# Patient Record
Sex: MALE | Race: WHITE | NOT HISPANIC OR LATINO | Employment: OTHER | URBAN - METROPOLITAN AREA
[De-identification: names, ages, dates, MRNs, and addresses within clinical notes are randomized per-mention and may not be internally consistent; named-entity substitution may affect disease eponyms.]

---

## 2017-08-08 ENCOUNTER — TELEPHONE (OUTPATIENT)
Dept: URGENT CARE | Facility: CLINIC | Age: 52
End: 2017-08-08

## 2017-08-08 NOTE — TELEPHONE ENCOUNTER
Notified pt of [facility] self pay policy $180 for initial consultation. And physician referral needed for consulatation. Patient stated he will contact us ([facility]) in a few weeks if he decides to see general surgery.

## 2017-08-08 NOTE — TELEPHONE ENCOUNTER
"Notified patients wife that a referral could not be made due to lack of health insurance. Advice patients wife to call [facility] and set up payment arrangements for consultation . Patient wife verbally understood.\"stated she will notify her  and make 0ther arrangements for consultation.  "

## 2017-08-14 ENCOUNTER — TELEPHONE (OUTPATIENT)
Dept: URGENT CARE | Facility: CLINIC | Age: 52
End: 2017-08-14

## 2017-08-16 ENCOUNTER — TELEPHONE (OUTPATIENT)
Dept: URGENT CARE | Facility: CLINIC | Age: 52
End: 2017-08-16

## 2017-08-16 DIAGNOSIS — L72.0 INCLUSION CYST: Primary | ICD-10-CM

## 2017-08-16 RX ORDER — SULFAMETHOXAZOLE AND TRIMETHOPRIM 800; 160 MG/1; MG/1
1 TABLET ORAL 2 TIMES DAILY
Qty: 14 TABLET | Refills: 0 | Status: SHIPPED | OUTPATIENT
Start: 2017-08-16 | End: 2019-03-13

## 2019-02-25 ENCOUNTER — OFFICE VISIT (OUTPATIENT)
Dept: INTERNAL MEDICINE | Facility: CLINIC | Age: 54
End: 2019-02-25

## 2019-02-25 ENCOUNTER — TRANSCRIBE ORDERS (OUTPATIENT)
Dept: INTERNAL MEDICINE | Facility: CLINIC | Age: 54
End: 2019-02-25

## 2019-02-25 VITALS
HEART RATE: 72 BPM | BODY MASS INDEX: 32.5 KG/M2 | DIASTOLIC BLOOD PRESSURE: 76 MMHG | TEMPERATURE: 97.5 F | WEIGHT: 227 LBS | HEIGHT: 70 IN | SYSTOLIC BLOOD PRESSURE: 118 MMHG

## 2019-02-25 DIAGNOSIS — R51.9 CHRONIC INTRACTABLE HEADACHE, UNSPECIFIED HEADACHE TYPE: Primary | ICD-10-CM

## 2019-02-25 DIAGNOSIS — G89.29 CHRONIC INTRACTABLE HEADACHE, UNSPECIFIED HEADACHE TYPE: Primary | ICD-10-CM

## 2019-02-25 PROBLEM — F10.11 HISTORY OF ALCOHOL ABUSE: Status: ACTIVE | Noted: 2019-02-25

## 2019-02-25 PROBLEM — F51.01 PRIMARY INSOMNIA: Status: ACTIVE | Noted: 2019-02-25

## 2019-02-25 PROBLEM — N40.0 BENIGN PROSTATIC HYPERPLASIA WITHOUT LOWER URINARY TRACT SYMPTOMS: Status: ACTIVE | Noted: 2019-02-25

## 2019-02-25 PROCEDURE — 99202 OFFICE O/P NEW SF 15 MIN: CPT | Performed by: PHYSICIAN ASSISTANT

## 2019-02-25 RX ORDER — CEFDINIR 300 MG/1
300 CAPSULE ORAL 2 TIMES DAILY
Qty: 20 CAPSULE | Refills: 0 | Status: SHIPPED | OUTPATIENT
Start: 2019-02-25 | End: 2019-03-13

## 2019-02-25 RX ORDER — DOXAZOSIN 2 MG/1
1 TABLET ORAL DAILY
Refills: 3 | COMMUNITY
Start: 2019-02-15 | End: 2020-06-19 | Stop reason: SDUPTHER

## 2019-02-25 NOTE — PATIENT INSTRUCTIONS
CT of the head with and without contrast tomorrow.  If CT does not show a sinus infection he will stop the Ceftin ear and we will try him on migraine medication.  He will follow-up in a couple weeks as scheduled for fasting labs.

## 2019-02-25 NOTE — PROGRESS NOTES
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 No pertinent surgical history.    Family History   Problem Relation Age of Onset   • Cancer Mother         lung and ovarian        Social History     Socioeconomic History   • Marital status:      Spouse name: Not on file   • Number of children: Not on file   • Years of education: Not on file   • Highest education level: Not on file   Social Needs   • Financial resource strain: Not on file   • Food insecurity - worry: Not on file   • Food insecurity - inability: Not on file   • Transportation needs - medical: Not on file   • Transportation needs - non-medical: Not on file   Occupational History   • Not on file   Tobacco Use   • Smoking status: Never Smoker   • Smokeless tobacco: Never Used   Substance and Sexual Activity   • Alcohol use: No     Frequency: Never     Comment: history of alcohol abuse, quit about 15 years ago    • Drug use: No   • Sexual activity: Not on file   Other Topics Concern   • Not on file   Social History Narrative   • Not on file       Allergies   Allergen Reactions   • Tetanus Toxoids Hives       Review of Systems:     Review of Systems   Constitutional: Positive for fatigue and unexpected weight gain. Negative for chills and fever.   HENT: Positive for sinus pressure. Negative for congestion and ear pain.    Eyes: Positive for pain and visual disturbance.   Respiratory: Negative for cough, chest tightness, shortness of breath and wheezing.    Cardiovascular: Negative for chest pain and palpitations.   Gastrointestinal: Negative for abdominal pain, blood in  "stool and constipation.   Skin: Negative for color change.   Allergic/Immunologic: Negative for environmental allergies.   Neurological: Positive for headache. Negative for dizziness and speech difficulty.   Psychiatric/Behavioral: Negative for decreased concentration. The patient is not nervous/anxious.        Vital Signs  Vitals:    02/25/19 1412   Weight: 103 kg (227 lb)   Height: 178 cm (70.08\")         Current Outpatient Medications:   •  doxazosin (CARDURA) 2 MG tablet, Take 1 tablet by mouth Daily., Disp: , Rfl: 3  •  zolpidem (AMBIEN) 10 MG tablet, Take 10 mg by mouth At Night As Needed for Sleep., Disp: , Rfl:   •  cephalexin (KEFLEX) 500 MG capsule, Take 1 capsule by mouth 2 (Two) Times a Day., Disp: 10 capsule, Rfl: 0  •  doxazosin (CARDURA) 1 MG tablet, Take 1 mg by mouth Every Night., Disp: , Rfl:   •  sulfamethoxazole-trimethoprim (BACTRIM DS,SEPTRA DS) 800-160 MG per tablet, Take 1 tablet by mouth 2 (Two) Times a Day., Disp: 14 tablet, Rfl: 0    Physical Exam:    Physical Exam     Results Review:    I reviewed the patient's new clinical results.    Assessment/Plan   Problem List Items Addressed This Visit     None          Plan of care reviewed with patient at the conclusion of today's visit. Education was provided regarding diagnosis, management, and any prescribed or recommended OTC medications.Patient verbalizes understanding of and agreement with management plan.     Meghan Urena LPN  "

## 2019-02-25 NOTE — PROGRESS NOTES
Patient Care Team:  Sonam Stiles PA-C as PCP - General (Internal Medicine)    Chief Complaint;:   Chief Complaint   Patient presents with   • Establish Care     does not have a previous PCP, seen at [facility]   • Sinus Problem     constant pressure x 2-3 months   • Headache        Subjective     HPI  54-year-old white male presents to the office today to establish care.  He has had a persistent almost daily headache for the last several months.  He does not have a regular PCP.  He is been going to the [facility] and being treated for sinus infections.  He denies any nasal congestion or drainage.  No cough.  He does note that bright lights and loud noises bother him.  The discomfort is around his eyes.  He does have new bifocals but is having difficulty adjusting to them.    He is a non-smoker but does chew tobacco.  He has a remote history of alcohol abuse.    He has an appointment in a couple weeks for a complete physical with fasting labs.  He does have a history of BPH followed by Dr. Octavio Mendieta.  He also has sleep disturbances and takes Ambien as needed.  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   • Stroke (CMS/[facility]) 1990       Social History     Socioeconomic History   • Marital status:      Spouse name: Not on file   • Number of children: Not on file   • Years of education: Not on file   • Highest education level: Not on file   Social Needs   • Financial resource strain: Not on file   • Food insecurity - worry: Not on file   • Food insecurity - inability: Not on file   • Transportation needs - medical: Not on file   • Transportation needs - non-medical: Not on file   Occupational History   • Not on file   Tobacco Use   • Smoking status: Never Smoker   • Smokeless tobacco: Never Used   Substance and Sexual Activity   • Alcohol use: No     Frequency: Never     Comment: history of alcohol abuse, quit about 15 years ago    • Drug use: No  "  • Sexual activity: Not on file   Other Topics Concern   • Not on file   Social History Narrative   • Not on file       Allergies   Allergen Reactions   • Tetanus Toxoids Hives       Review of Systems:     Review of Systems   Constitutional: Positive for fatigue.   HENT: Negative.    Eyes: Positive for photophobia.   Respiratory: Negative.    Cardiovascular: Negative.    Neurological: Positive for headache. Negative for dizziness, tremors, seizures, syncope, facial asymmetry, speech difficulty, weakness, light-headedness, numbness, memory problem and confusion.   Psychiatric/Behavioral: Negative.        Vital Signs  Vitals:    02/25/19 1412   BP: 118/76   BP Location: Right arm   Patient Position: Sitting   Cuff Size: Large Adult   Pulse: 72   Temp: 97.5 °F (36.4 °C)   TempSrc: Temporal   Weight: 103 kg (227 lb)   Height: 178 cm (70.08\")         Current Outpatient Medications:   •  doxazosin (CARDURA) 2 MG tablet, Take 1 tablet by mouth Daily., Disp: , Rfl: 3  •  zolpidem (AMBIEN) 10 MG tablet, Take 10 mg by mouth At Night As Needed for Sleep., Disp: , Rfl:   •  cefdinir (OMNICEF) 300 MG capsule, Take 1 capsule by mouth 2 (Two) Times a Day., Disp: 20 capsule, Rfl: 0  •  doxazosin (CARDURA) 1 MG tablet, Take 1 mg by mouth Every Night., Disp: , Rfl:   •  sulfamethoxazole-trimethoprim (BACTRIM DS,SEPTRA DS) 800-160 MG per tablet, Take 1 tablet by mouth 2 (Two) Times a Day., Disp: 14 tablet, Rfl: 0    Physical Exam:    Physical Exam   Constitutional: He is oriented to person, place, and time. He appears well-developed and well-nourished.   HENT:   Head: Normocephalic and atraumatic.   Right Ear: External ear normal.   Left Ear: External ear normal.   Mouth/Throat: Oropharynx is clear and moist.   Neck: Normal range of motion. Neck supple.   Cardiovascular: Normal rate, regular rhythm and normal heart sounds.   Pulmonary/Chest: Effort normal and breath sounds normal.   Lymphadenopathy:     He has no cervical adenopathy. "   Neurological: He is alert and oriented to person, place, and time. He displays normal reflexes. No cranial nerve deficit or sensory deficit. He exhibits normal muscle tone. Coordination normal.   Skin: Skin is warm and dry.   Psychiatric: He has a normal mood and affect. His behavior is normal. Thought content normal.   Nursing note and vitals reviewed.      Procedures      Assessment/Plan   Problem List Items Addressed This Visit     None      Visit Diagnoses     Chronic intractable headache, unspecified headache type    -  Primary    Relevant Orders    CT Head With & Without Contrast        Patient Instructions   CT of the head with and without contrast tomorrow.  If CT does not show a sinus infection he will stop the Ceftin ear and we will try him on migraine medication.  He will follow-up in a couple weeks as scheduled for fasting labs.      Plan of care reviewed with patient at the conclusion of today's visit. Education was provided regarding diagnosis, management, and any prescribed or recommended OTC medications.Patient verbalizes understanding of and agreement with management plan.     Sonam Stiles PA-C

## 2019-02-26 ENCOUNTER — HOSPITAL ENCOUNTER (OUTPATIENT)
Dept: CT IMAGING | Facility: HOSPITAL | Age: 54
Discharge: HOME | End: 2019-02-26
Admitting: PHYSICIAN ASSISTANT

## 2019-02-26 DIAGNOSIS — R51.9 CHRONIC INTRACTABLE HEADACHE, UNSPECIFIED HEADACHE TYPE: ICD-10-CM

## 2019-02-26 DIAGNOSIS — G89.29 CHRONIC INTRACTABLE HEADACHE, UNSPECIFIED HEADACHE TYPE: ICD-10-CM

## 2019-02-26 PROCEDURE — 70450 CT HEAD/BRAIN W/O DYE: CPT

## 2019-02-27 RX ORDER — SUMATRIPTAN 50 MG/1
TABLET, FILM COATED ORAL
Qty: 9 TABLET | Refills: 3 | Status: SHIPPED | OUTPATIENT
Start: 2019-02-27 | End: 2020-06-19

## 2019-03-13 ENCOUNTER — OFFICE VISIT (OUTPATIENT)
Dept: INTERNAL MEDICINE | Facility: CLINIC | Age: 54
End: 2019-03-13

## 2019-03-13 VITALS
HEIGHT: 70 IN | WEIGHT: 232 LBS | SYSTOLIC BLOOD PRESSURE: 126 MMHG | DIASTOLIC BLOOD PRESSURE: 82 MMHG | HEART RATE: 76 BPM | BODY MASS INDEX: 33.21 KG/M2

## 2019-03-13 DIAGNOSIS — E78.2 MIXED HYPERLIPIDEMIA: Primary | ICD-10-CM

## 2019-03-13 DIAGNOSIS — R51.9 CHRONIC NONINTRACTABLE HEADACHE, UNSPECIFIED HEADACHE TYPE: Primary | ICD-10-CM

## 2019-03-13 DIAGNOSIS — E66.09 CLASS 1 OBESITY DUE TO EXCESS CALORIES WITHOUT SERIOUS COMORBIDITY WITH BODY MASS INDEX (BMI) OF 33.0 TO 33.9 IN ADULT: ICD-10-CM

## 2019-03-13 DIAGNOSIS — F51.01 PRIMARY INSOMNIA: ICD-10-CM

## 2019-03-13 DIAGNOSIS — G47.00 INSOMNIA, UNSPECIFIED TYPE: Primary | ICD-10-CM

## 2019-03-13 DIAGNOSIS — J34.89 NASAL OBSTRUCTION: ICD-10-CM

## 2019-03-13 DIAGNOSIS — N40.0 BENIGN PROSTATIC HYPERPLASIA WITHOUT LOWER URINARY TRACT SYMPTOMS: ICD-10-CM

## 2019-03-13 DIAGNOSIS — Z00.00 ENCOUNTER FOR PREVENTIVE HEALTH EXAMINATION: ICD-10-CM

## 2019-03-13 DIAGNOSIS — G89.29 CHRONIC NONINTRACTABLE HEADACHE, UNSPECIFIED HEADACHE TYPE: Primary | ICD-10-CM

## 2019-03-13 PROBLEM — R73.9 HYPERGLYCEMIA: Status: ACTIVE | Noted: 2019-03-13

## 2019-03-13 PROBLEM — R79.89 ELEVATED LIVER FUNCTION TESTS: Status: ACTIVE | Noted: 2019-03-13

## 2019-03-13 PROBLEM — E66.811 CLASS 1 OBESITY DUE TO EXCESS CALORIES WITHOUT SERIOUS COMORBIDITY IN ADULT: Status: ACTIVE | Noted: 2019-03-13

## 2019-03-13 LAB
ALBUMIN SERPL-MCNC: 4.99 G/DL (ref 3.2–4.8)
ALBUMIN/GLOB SERPL: 2.5 G/DL (ref 1.5–2.5)
ALP SERPL-CCNC: 64 U/L (ref 25–100)
ALT SERPL W P-5'-P-CCNC: 43 U/L (ref 7–40)
ANION GAP SERPL CALCULATED.3IONS-SCNC: 9 MMOL/L (ref 3–11)
ARTICHOKE IGE QN: 136 MG/DL (ref 0–130)
AST SERPL-CCNC: 28 U/L (ref 0–33)
BASOPHILS # BLD AUTO: 0.02 10*3/MM3 (ref 0–0.2)
BASOPHILS NFR BLD AUTO: 0.3 % (ref 0–1)
BILIRUB SERPL-MCNC: 0.6 MG/DL (ref 0.3–1.2)
BUN BLD-MCNC: 18 MG/DL (ref 9–23)
BUN/CREAT SERPL: 13.7 (ref 7–25)
CALCIUM SPEC-SCNC: 9.6 MG/DL (ref 8.7–10.4)
CHLORIDE SERPL-SCNC: 105 MMOL/L (ref 99–109)
CHOLEST SERPL-MCNC: 216 MG/DL (ref 0–200)
CO2 SERPL-SCNC: 26 MMOL/L (ref 20–31)
CREAT BLD-MCNC: 1.31 MG/DL (ref 0.6–1.3)
CRP SERPL-MCNC: 0.13 MG/DL (ref 0–1)
DEPRECATED RDW RBC AUTO: 42.5 FL (ref 37–54)
EOSINOPHIL # BLD AUTO: 0.09 10*3/MM3 (ref 0–0.3)
EOSINOPHIL NFR BLD AUTO: 1.5 % (ref 0–3)
ERYTHROCYTE [DISTWIDTH] IN BLOOD BY AUTOMATED COUNT: 12.5 % (ref 11.3–14.5)
ERYTHROCYTE [SEDIMENTATION RATE] IN BLOOD: 8 MM/HR (ref 0–20)
GFR SERPL CREATININE-BSD FRML MDRD: 57 ML/MIN/1.73
GLOBULIN UR ELPH-MCNC: 2 GM/DL
GLUCOSE BLD-MCNC: 108 MG/DL (ref 70–100)
HCT VFR BLD AUTO: 43.2 % (ref 38.9–50.9)
HDLC SERPL-MCNC: 33 MG/DL (ref 40–60)
HGB BLD-MCNC: 14.3 G/DL (ref 13.1–17.5)
IMM GRANULOCYTES # BLD AUTO: 0.02 10*3/MM3 (ref 0–0.05)
IMM GRANULOCYTES NFR BLD AUTO: 0.3 % (ref 0–0.6)
LYMPHOCYTES # BLD AUTO: 1.79 10*3/MM3 (ref 0.6–4.8)
LYMPHOCYTES NFR BLD AUTO: 29.8 % (ref 24–44)
MCH RBC QN AUTO: 30.6 PG (ref 27–31)
MCHC RBC AUTO-ENTMCNC: 33.1 G/DL (ref 32–36)
MCV RBC AUTO: 92.5 FL (ref 80–99)
MONOCYTES # BLD AUTO: 0.43 10*3/MM3 (ref 0–1)
MONOCYTES NFR BLD AUTO: 7.2 % (ref 0–12)
NEUTROPHILS # BLD AUTO: 3.68 10*3/MM3 (ref 1.5–8.3)
NEUTROPHILS NFR BLD AUTO: 61.2 % (ref 41–71)
PLATELET # BLD AUTO: 269 10*3/MM3 (ref 150–450)
PMV BLD AUTO: 10.2 FL (ref 6–12)
POTASSIUM BLD-SCNC: 4.8 MMOL/L (ref 3.5–5.5)
PROT SERPL-MCNC: 7 G/DL (ref 5.7–8.2)
PSA SERPL-MCNC: 0.48 NG/ML (ref 0–4)
RBC # BLD AUTO: 4.67 10*6/MM3 (ref 4.2–5.76)
SODIUM BLD-SCNC: 140 MMOL/L (ref 132–146)
TRIGL SERPL-MCNC: 230 MG/DL (ref 0–150)
WBC NRBC COR # BLD: 6.01 10*3/MM3 (ref 3.5–10.8)

## 2019-03-13 PROCEDURE — 36415 COLL VENOUS BLD VENIPUNCTURE: CPT | Performed by: PHYSICIAN ASSISTANT

## 2019-03-13 PROCEDURE — 85025 COMPLETE CBC W/AUTO DIFF WBC: CPT | Performed by: PHYSICIAN ASSISTANT

## 2019-03-13 PROCEDURE — G0103 PSA SCREENING: HCPCS | Performed by: PHYSICIAN ASSISTANT

## 2019-03-13 PROCEDURE — 80061 LIPID PANEL: CPT | Performed by: PHYSICIAN ASSISTANT

## 2019-03-13 PROCEDURE — 99396 PREV VISIT EST AGE 40-64: CPT | Performed by: PHYSICIAN ASSISTANT

## 2019-03-13 PROCEDURE — 80053 COMPREHEN METABOLIC PANEL: CPT | Performed by: PHYSICIAN ASSISTANT

## 2019-03-13 PROCEDURE — 86140 C-REACTIVE PROTEIN: CPT | Performed by: PHYSICIAN ASSISTANT

## 2019-03-13 RX ORDER — ATORVASTATIN CALCIUM 20 MG/1
20 TABLET, FILM COATED ORAL DAILY
Qty: 30 TABLET | Refills: 2 | Status: SHIPPED | OUTPATIENT
Start: 2019-03-13 | End: 2019-07-18 | Stop reason: SDUPTHER

## 2019-03-13 RX ORDER — ZOLPIDEM TARTRATE 10 MG/1
10 TABLET ORAL NIGHTLY PRN
Qty: 30 TABLET | Refills: 2 | Status: SHIPPED | OUTPATIENT
Start: 2019-03-13 | End: 2019-07-25 | Stop reason: SDUPTHER

## 2019-03-13 NOTE — PATIENT INSTRUCTIONS
Check fasting labs today.  Will refer for screening colonoscopy.    We will also refer to ENT for nasal obstruction and prior fractured nose.  Will also refer him to neurology for further evaluations of his headache.    Encouraged 5 pound weight loss by next visit.    He will follow-up here in 3 months.

## 2019-03-13 NOTE — PROGRESS NOTES
Patient Care Team:  Sonam Stiles PA-C as PCP - General (Internal Medicine)    Chief Complaint;:   Chief Complaint   Patient presents with   • Annual Exam     He is fasting        Subjective     HPI  54-year-old white male presents to the office today for his fasting physical exam.  It has been several years since he has seen health provider.  I saw him a few weeks ago for chronic headache.  The headaches been going on for the last 2-3 months.  Initially it was fluctuating from right to left side of the face but now it is primarily just on the left side.  He had a CT scan of the head that looked fine.  He tried Imitrex without any relief at all.  He states the pain is almost like a superficial burning pain.  He denies any visual changes, tinnitus, or hearing loss.    He is also had some nasal fullness and as he describes feels like an obstruction up in his nose.  He did fracture his nose as a child.    He also had put on his new patient form a few weeks ago that he had had a stroke in the 1990s while in the .  He states that after a night of drinking he got up to go the bathroom and passed out.  He woke up in the hospital and was told that he had had a stroke.  He had no extremity weakness speech difficulties or other residual symptoms.  He states he was discharged the next day without any additional testing.    Insomnia he has been on Ambien for the last 18 months.  He takes it occasionally as needed.    He also has BPH which is controlled with doxazosin, he only has to get up to urinate about once a night.  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 No pertinent surgical history.    Family History   Problem Relation Age of Onset   • Cancer Mother         lung and ovarian        Social History     Socioeconomic History   • Marital status:      Spouse name: Not on file   • Number of children: Not  "on file   • Years of education: Not on file   • Highest education level: Not on file   Social Needs   • Financial resource strain: Not on file   • Food insecurity - worry: Not on file   • Food insecurity - inability: Not on file   • Transportation needs - medical: Not on file   • Transportation needs - non-medical: Not on file   Occupational History   • Not on file   Tobacco Use   • Smoking status: Never Smoker   • Smokeless tobacco: Never Used   Substance and Sexual Activity   • Alcohol use: No     Frequency: Never     Comment: history of alcohol abuse, quit about 15 years ago    • Drug use: No   • Sexual activity: Not on file   Other Topics Concern   • Not on file   Social History Narrative   • Not on file       Allergies   Allergen Reactions   • Tetanus Toxoids Hives       Review of Systems:     Review of Systems   Constitutional: Negative.    HENT: Negative.    Eyes: Negative.    Respiratory: Negative.    Cardiovascular: Negative.    Gastrointestinal: Negative.    Endocrine: Negative.    Genitourinary: Negative for urinary incontinence, decreased urine volume, difficulty urinating, discharge, dysuria, flank pain, frequency, hematuria, nocturia, penile pain, erectile dysfunction, penile swelling, scrotal swelling, testicular pain and urgency.   Musculoskeletal: Positive for arthralgias. Negative for back pain, gait problem, joint swelling, myalgias, neck pain and bursitis.   Skin: Negative.    Neurological: Positive for headache. Negative for tremors, seizures, syncope, speech difficulty, weakness and numbness.   Hematological: Negative.    Psychiatric/Behavioral: Negative.        Vital Signs  Vitals:    03/13/19 0827   BP: 126/82   BP Location: Left arm   Patient Position: Sitting   Cuff Size: Adult   Pulse: 76   Weight: 105 kg (232 lb)   Height: 178 cm (70.08\")         Current Outpatient Medications:   •  doxazosin (CARDURA) 2 MG tablet, Take 1 tablet by mouth Daily., Disp: , Rfl: 3  •  SUMAtriptan (IMITREX) 50 " MG tablet, Take one tablet at onset of headache. May repeat dose one time in 2 hours if headache not relieved., Disp: 9 tablet, Rfl: 3  •  zolpidem (AMBIEN) 10 MG tablet, Take 10 mg by mouth At Night As Needed for Sleep., Disp: , Rfl:     Health Maintenance:  not up to date. Referral placed.     Physical Exam:    Physical Exam   Constitutional: He is oriented to person, place, and time. He appears well-developed and well-nourished.   HENT:   Head: Normocephalic and atraumatic.   Right Ear: External ear normal.   Left Ear: External ear normal.   Nose: Nose normal.   Mouth/Throat: Oropharynx is clear and moist.   Neck: Normal range of motion. Neck supple. No thyromegaly present.   Cardiovascular: Normal rate, regular rhythm and normal heart sounds.   Pulmonary/Chest: Effort normal and breath sounds normal.   Abdominal: Soft. Bowel sounds are normal. He exhibits no distension and no mass. There is no tenderness. There is no rebound and no guarding. No hernia.   Musculoskeletal: Normal range of motion. He exhibits no edema, tenderness or deformity.   Lymphadenopathy:     He has no cervical adenopathy.   Neurological: He is oriented to person, place, and time. He displays normal reflexes. No cranial nerve deficit or sensory deficit. He exhibits normal muscle tone. Coordination normal.   Skin: Skin is warm and dry.   Psychiatric: He has a normal mood and affect. His behavior is normal. Judgment and thought content normal.   Nursing note and vitals reviewed.       Results Review:    I reviewed the patient's new clinical results.    Assessment/Plan   Problem List Items Addressed This Visit        Digestive    Class 1 obesity due to excess calories without serious comorbidity in adult       Genitourinary    Benign prostatic hyperplasia without lower urinary tract symptoms    Relevant Orders    PSA SCREENING       Other    Primary insomnia      Other Visit Diagnoses     Chronic nonintractable headache, unspecified headache  type    -  Primary    Refer to neurology.    Relevant Orders    Comprehensive metabolic panel    Lipid panel    CBC w AUTO Differential    PSA SCREENING    Ambulatory Referral to Neurology    C-reactive protein    Sedimentation rate, automated    Encounter for preventive health examination        Check fasting labs.  Schedule for screening colonoscopy.    Relevant Orders    Comprehensive metabolic panel    Lipid panel    CBC w AUTO Differential    PSA SCREENING    Ambulatory Referral For Screening Colonoscopy    Nasal obstruction        Refer to ENT.    Relevant Orders    Ambulatory Referral to ENT (Otolaryngology)        Patient Instructions   Check fasting labs today.  Will refer for screening colonoscopy.    We will also refer to ENT for nasal obstruction and prior fractured nose.  Will also refer him to neurology for further evaluations of his headache.    Encouraged 5 pound weight loss by next visit.    He will follow-up here in 3 months.    Counseled the patient on the following topics, disease prevention  Plan of care reviewed with patient at the conclusion of today's visit. Education was provided regarding diagnosis, management, and any prescribed or recommended OTC medications.Patient verbalizes understanding of and agreement with management plan.     Sonam Stiles PA-C

## 2019-07-18 RX ORDER — ATORVASTATIN CALCIUM 20 MG/1
20 TABLET, FILM COATED ORAL DAILY
Qty: 30 TABLET | Refills: 2 | Status: SHIPPED | OUTPATIENT
Start: 2019-07-18 | End: 2019-11-25 | Stop reason: SDUPTHER

## 2019-07-25 DIAGNOSIS — G47.00 INSOMNIA, UNSPECIFIED TYPE: ICD-10-CM

## 2019-07-25 RX ORDER — ZOLPIDEM TARTRATE 10 MG/1
10 TABLET ORAL NIGHTLY PRN
Qty: 30 TABLET | Refills: 2 | Status: SHIPPED | OUTPATIENT
Start: 2019-07-25 | End: 2019-11-18 | Stop reason: SDUPTHER

## 2019-07-25 NOTE — TELEPHONE ENCOUNTER
Last seen:3/13/19    Last filled:3/13/19    Next appt:NON SCHEDULED    CHARISSE:PENDING    (JACQUELINE'S PATIENT)

## 2019-08-01 ENCOUNTER — TELEPHONE (OUTPATIENT)
Dept: INTERNAL MEDICINE | Facility: CLINIC | Age: 54
End: 2019-08-01

## 2019-08-01 RX ORDER — CLARITHROMYCIN 500 MG/1
500 TABLET, COATED ORAL 2 TIMES DAILY
Qty: 20 TABLET | Refills: 0 | Status: SHIPPED | OUTPATIENT
Start: 2019-08-01 | End: 2020-06-19

## 2019-08-01 RX ORDER — CLARITHROMYCIN 250 MG/1
500 TABLET, FILM COATED ORAL 2 TIMES DAILY
Qty: 20 TABLET | Refills: 0 | Status: SHIPPED | OUTPATIENT
Start: 2019-08-01 | End: 2019-08-01

## 2019-08-01 NOTE — TELEPHONE ENCOUNTER
ZEFERINO FROM PHARMACY CALLED NEEDING VERIFICATION ON PT'S CLARITHROMYCIN  IT WAS FOR 5 DAYS AND PT TOLD HIM IT WAS SUPPOSED TO BE FOR 10 DAYS   I INFORMED ZEFERINO PER JACQUELINE THAT IT IS FOR 10 DAYS

## 2019-09-13 ENCOUNTER — TELEPHONE (OUTPATIENT)
Dept: INTERNAL MEDICINE | Facility: CLINIC | Age: 54
End: 2019-09-13

## 2019-09-13 NOTE — TELEPHONE ENCOUNTER
PT'S WIFE CALLED STATING PT HAS A SPIDER BITE IT'S RED AND SWOLLEN  I ADVISED HER HE NEEDS TO GO TO ER SHE WANTED TO KNOW IF HE COULD GO TO URGENT CARE AND I ADVISED HER PER ZINA YES

## 2019-11-18 DIAGNOSIS — G47.00 INSOMNIA, UNSPECIFIED TYPE: ICD-10-CM

## 2019-11-18 RX ORDER — ZOLPIDEM TARTRATE 10 MG/1
10 TABLET ORAL NIGHTLY PRN
Qty: 30 TABLET | Refills: 2 | Status: SHIPPED | OUTPATIENT
Start: 2019-11-18 | End: 2020-06-19 | Stop reason: SDUPTHER

## 2019-11-25 RX ORDER — ATORVASTATIN CALCIUM 20 MG/1
TABLET, FILM COATED ORAL
Qty: 30 TABLET | Refills: 5 | Status: SHIPPED | OUTPATIENT
Start: 2019-11-25 | End: 2020-06-19 | Stop reason: SDUPTHER

## 2020-01-27 NOTE — TELEPHONE ENCOUNTER
Continuity of Care Document (CCD)

 Created on:2020



Patient:Wilmer Campbell

Sex:Male

:1931

External Reference #:[id number]





Demographics







 Address  [address] [place] [address]

 

 Home Phone  [phone]

 

 Mobile Phone  [phone]

 

 Preferred Language  en

 

 Marital Status  Not  or 

 

 Sikh Affiliation  Unknown

 

 Race  White

 

 Ethnic Group  Not  or 









Author







 Name  Florencio Baker M.D.

 

 Address  [address]



   Unavailable



   [place] [address]









Care Team Providers







 Name  Role  Phone

 

 Tab Sadler MD - General Practice  Care Team Information   [phone]
[phone]









Problems







 Description

 

 No Information Available







Social History







 Type  Date  Description  Comments

 

 Birth Sex    Unknown  

 

 ETOH Use    Occasionally consumed wine in the past  

 

 Tobacco Use  Start: Unknown  Patient has never smoked  

 

 Smoking Status  Reviewed: 20  Patient has never smoked  







Allergies, Adverse Reactions, Alerts







 Active Allergies  Reaction  Severity  Comments  Date

 

 Contrast Dye      CT dye  2018

 

 Statins        2018







Medications







 Active Medications  SIG  Qnty  Indications  Ordering  Date



         Provider  

 

 Warfarin Sodium  Take 1 Tablet By      Unknown  



              5mg  Mouth Every Day        



 Tablets          



           

 

 Cephalexin  Take 1 Capsule By      Unknown  



         250mg Capsules  Mouth Three Times A        



   Day        

 

 Furosemide  Take 2 Tablets By      Unknown  



         20mg Tablets  Mouth Every Day For        



   5 Days Then        



   Decrease To Once        



   Daily        

 

 Prednisone  Take 1 Tablet By      Unknown  



         50mg Tablets  Mouth Every 6 Hours        



   as Directed        



   Starting AT 10:30        



   PM On         

 

 Amoxicillin  Take 4 Tablets By      Unknown  



          500mg Tablets  Mouth 1 Hour Before        



   Dental Procedure        

 

 Metoprolol Succinate        Unknown  



 ER          



 25mg Tablets ER 24HR          



           

 

 Praluent        Unknown  



       75mg/ml Solution          



 Auto-Inject          



           

 

 Lisinopril        Unknown  



         2.5mg Tablets          



           

 

 Neurontin        Unknown  



        300mg Capsules          



           

 

 Omeprazole        Unknown  



         20mg Capsules          



 DR          

 

 Meclizine HCL        Unknown  



            25mg          



 Tablets          



           

 

 Guaifenesin  take 1 tablet by      Unknown  



          400mg Tablets  mouth every 4 hours        



   as needed for chest        



   congestion        

 

 Ferrous Sulfate  1 by mouth every      Unknown  



              325(65Fe)  day        



 mg Tablets          



           

 

 Temazepam        Unknown  



        7.5mg Capsules          



           

 

 Levothyroxine Sodium        Unknown  



           



 112mcg Tablets          



           

 

 Aspir-Low  1 by mouth every      Unknown  



        81mg Tablets DR  day        



           







Immunizations







 Description

 

 No Information Available







Vital Signs







 Date  Vital  Result  Comment

 

 2020  2:22pm  Intraocular Pressure Right Eye  13 mmHg  









 Intraocular Pressure Left Eye  13 mmHg  









 2019 10:23am  Intraocular Pressure Right Eye  13 mmHg  









 Intraocular Pressure Left Eye  12 mmHg  







Results







 Description

 

 No Information Available







Procedures







 Date  Code  Description  Status

 

 2020  62473  Oct Retina  Completed

 

 2020  79671  Eye Exam Est Intermediate  Completed

 

 2019  09802  Ophthalmoscopy Subsequent  Completed

 

 2019  60511  Sensorimotor Examination W/Mult Measurements Ocular  
Completed



     Deviation  

 

 2019  46784  Refraction  Completed

 

 2019  44943  Eye Exam Est Intermediate  Completed

 

 2019  10346  Fundus Photography W/Interpretation & Report  Completed

 

 2019  81803  Ophthalmoscopy Subsequent  Completed

 

 2019  64602  Eye Exam Est Intermediate  Completed







Medical Devices







 Description

 

 No Information Available







Encounters







 Description

 

 No Information Available







Assessments







 Date  Code  Description  Provider

 

 2020  H35.373  Puckering of macula, bilateral  Florencio Baker M.D.

 

 2020  Z96.1  Presence of intraocular lens  Florencio Baker M.D.

 

 2020  H43.813  Vitreous degeneration, bilateral  Florencio Baker M.D.

 

 2020  H50.22  Vertical strabismus, left eye  Florencio Baker M.D.

 

 2019  Z96.1  Presence of intraocular lens  Florencio Baker M.D.

 

 2019  H35.373  Puckering of macula, bilateral  Florencio Baker M.D.

 

 2019  H43.813  Vitreous degeneration, bilateral  Florencio Baker M.D.

 

 2019  H50.22  Vertical strabismus, left eye  Florencio Baker M.D.

 

 2019  H43.813  Vitreous degeneration, bilateral  Florencio Baker M.D.

 

 2019  H35.373  Puckering of macula, bilateral  Florencio Baker M.D.







Plan of Treatment

2020 - Florencio Baker M.D.H35.373 Puckering of macula, lelzkmdptM66.1 
Presence of intraocular lensH43.813 Vitreous degeneration, sollaillsE32.22 
Vertical strabismus, left eyeFollow up:1 yr



Functional Status







 Description

 

 No Information Available







Mental Status







 Description

 

 No Information Available







Referrals







 Description

 

 No Information Available Pt called and had been at the [facility] office to see Dr Morrissey today and the Dr was called out for surgery. His next appt is Aug 29th with Dr Morrissey. He called requesting an Rx for Bactrim. Dr Parrish consulted and he agreed to send in more Bactrim to the pt's pharmacy.

## 2020-06-19 ENCOUNTER — OFFICE VISIT (OUTPATIENT)
Dept: INTERNAL MEDICINE | Facility: CLINIC | Age: 55
End: 2020-06-19

## 2020-06-19 VITALS
TEMPERATURE: 97.1 F | OXYGEN SATURATION: 100 % | BODY MASS INDEX: 32.78 KG/M2 | DIASTOLIC BLOOD PRESSURE: 70 MMHG | HEIGHT: 70 IN | RESPIRATION RATE: 16 BRPM | HEART RATE: 65 BPM | WEIGHT: 229 LBS | SYSTOLIC BLOOD PRESSURE: 122 MMHG

## 2020-06-19 DIAGNOSIS — G47.00 INSOMNIA, UNSPECIFIED TYPE: ICD-10-CM

## 2020-06-19 DIAGNOSIS — F51.01 PRIMARY INSOMNIA: ICD-10-CM

## 2020-06-19 DIAGNOSIS — Z79.899 LONG-TERM USE OF HIGH-RISK MEDICATION: ICD-10-CM

## 2020-06-19 DIAGNOSIS — Z12.5 SCREENING PSA (PROSTATE SPECIFIC ANTIGEN): ICD-10-CM

## 2020-06-19 DIAGNOSIS — E66.09 CLASS 1 OBESITY DUE TO EXCESS CALORIES WITHOUT SERIOUS COMORBIDITY WITH BODY MASS INDEX (BMI) OF 32.0 TO 32.9 IN ADULT: ICD-10-CM

## 2020-06-19 DIAGNOSIS — N40.0 BENIGN PROSTATIC HYPERPLASIA WITHOUT LOWER URINARY TRACT SYMPTOMS: ICD-10-CM

## 2020-06-19 DIAGNOSIS — E78.2 MIXED HYPERLIPIDEMIA: Primary | ICD-10-CM

## 2020-06-19 DIAGNOSIS — R73.01 IMPAIRED FASTING GLUCOSE: ICD-10-CM

## 2020-06-19 PROCEDURE — 99214 OFFICE O/P EST MOD 30 MIN: CPT | Performed by: NURSE PRACTITIONER

## 2020-06-19 RX ORDER — ATORVASTATIN CALCIUM 20 MG/1
20 TABLET, FILM COATED ORAL DAILY
Qty: 90 TABLET | Refills: 0 | Status: SHIPPED | OUTPATIENT
Start: 2020-06-19 | End: 2021-04-05 | Stop reason: SDUPTHER

## 2020-06-19 RX ORDER — ZOLPIDEM TARTRATE 10 MG/1
10 TABLET ORAL NIGHTLY PRN
Qty: 30 TABLET | Refills: 2 | Status: SHIPPED | OUTPATIENT
Start: 2020-06-19 | End: 2020-10-01

## 2020-06-19 RX ORDER — DOXAZOSIN 2 MG/1
2 TABLET ORAL DAILY
Qty: 90 TABLET | Refills: 0 | Status: SHIPPED | OUTPATIENT
Start: 2020-06-19 | End: 2021-04-05 | Stop reason: SDUPTHER

## 2020-06-19 NOTE — PROGRESS NOTES
Braydon Eugene  1965  [id number]  Patient Care Team:  Sonam Stiles PA-C as PCP - General (Internal Medicine)    Braydon Eugene is a pleasant 55 y.o. male who presents for evaluation of Med Refill    Chief Complaint   Patient presents with   • Med Refill       HPI:   Saw CARLITA Lainez until she retired in Dec 2019.  Insomnia:  Mind races when he tries to sleep.  On ambien 10mg almost nightly for about 2 yrs,  If not taking sleeps about 3 hrs total, not rested the next day.  CHO:  On statin for 1 yr, due for labs.  BPH:  On doxyzosin for 15-16 yrs.  Past saw urology yearly.  Not x 2 yr.  Nocturia 0-1 times nightly.  Use Smokeless tobacco, no cigarette smoking, no etoh use.    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 No pertinent surgical history.  Family History   Problem Relation Age of Onset   • Cancer Mother         lung and ovarian      Social History     Tobacco Use   Smoking Status Never Smoker   Smokeless Tobacco Never Used     Allergies   Allergen Reactions   • Tetanus Toxoids Hives       Current Outpatient Medications:   •  atorvastatin (LIPITOR) 20 MG tablet, Take 1 tablet by mouth Daily., Disp: 90 tablet, Rfl: 0  •  doxazosin (CARDURA) 2 MG tablet, Take 1 tablet by mouth Daily., Disp: 90 tablet, Rfl: 0  •  zolpidem (AMBIEN) 10 MG tablet, Take 1 tablet by mouth At Night As Needed for Sleep., Disp: 30 tablet, Rfl: 2    Review of Systems   Constitutional: Negative.    HENT: Negative.    Eyes: Negative.    Respiratory: Negative.    Cardiovascular: Negative.    Gastrointestinal: Negative.    Endocrine: Negative.    Genitourinary: Negative.    Musculoskeletal: Negative.    Skin: Negative.    Allergic/Immunologic: Negative.    Neurological: Negative.    Hematological: Negative.    Psychiatric/Behavioral: Negative.      /70   Pulse 65   Temp 97.1 °F (36.2 °C)  "(Temporal)   Resp 16   Ht 178 cm (70.08\")   Wt 104 kg (229 lb)   SpO2 100%   BMI 32.78 kg/m²     Physical Exam   Constitutional: He appears well-developed and well-nourished.   HENT:   Head: Normocephalic and atraumatic.   Right Ear: External ear normal.   Left Ear: External ear normal.   Mouth/Throat: Oropharynx is clear and moist.   Eyes: Conjunctivae and EOM are normal.   Neck: Normal range of motion. Neck supple.   Cardiovascular: Normal rate, regular rhythm and normal heart sounds.   Pulmonary/Chest: Effort normal and breath sounds normal.   Abdominal: Soft. Bowel sounds are normal.   Musculoskeletal: Normal range of motion.   Neurological: He is alert.   Skin: Skin is warm and dry.   Psychiatric: He has a normal mood and affect. His behavior is normal. Judgment and thought content normal.       Results Review:  None    Assessment/Plan:  Braydon was seen today for med refill.    Diagnoses and all orders for this visit:    Mixed hyperlipidemia  -     CBC & Differential; Future  -     Comprehensive Metabolic Panel; Future  -     Lipid Panel; Future  -     TSH Rfx On Abnormal To Free T4; Future  -     Microalbumin / Creatinine Urine Ratio - Urine, Clean Catch; Future    Insomnia, unspecified type  -     Oral Fluid Drug Screen - Saliva, Oral Cavity; Future  -     zolpidem (AMBIEN) 10 MG tablet; Take 1 tablet by mouth At Night As Needed for Sleep.    Primary insomnia    Benign prostatic hyperplasia without lower urinary tract symptoms    Class 1 obesity due to excess calories without serious comorbidity with body mass index (BMI) of 32.0 to 32.9 in adult    Impaired fasting glucose  -     Hemoglobin A1c; Future    Screening PSA (prostate specific antigen)  -     PSA Screen; Future    Long-term use of high-risk medication  -     Cancel: Pain Management Profile (13 Drugs) Urine - Urine, Clean Catch; Future  -     Oral Fluid Drug Screen - Saliva, Oral Cavity; Future    Other orders  -     atorvastatin (LIPITOR) 20 MG " tablet; Take 1 tablet by mouth Daily.  -     doxazosin (CARDURA) 2 MG tablet; Take 1 tablet by mouth Daily.       Patient Instructions   Return in the next few weeks for fasting labs.  Orders placed today.    Refill for ambien 10mg.  Discussed trying 1/2 tablet and RemFresh.  This patient is on a controlled substance which improves symptoms/quality of life and is aware of the risks, benefits and possible side-effects current treatment. The patient denies any medication side-effects at this time. A controlled substance agreement will be obtained or is currently on file. We reviewed required monitoring for controlled substances including but not limited to quarterly follow-up visits, annual depression screening, and urine drug screens to which the patient is agreeable. A CHARISSE report has been or shortly will be reviewed. There are no signs of deviation or misuse.     Hyperlipidemia:  Continue your medications as directed.  Exercise at least 30 min 3-4 days per week.  Goal for 150 min/week total.   •Diet is a vital tool for lowering cholesterol and blood pressure levels, which are two major risk factors for cardiovascular disease.   •Patients with high cholesterol and high blood pressure levels should eat plenty of vegetables, fruits and whole grains and incorporate low-fat dairy products, poultry, fish, legumes, non-tropical vegetable oils and nuts into their diet. They should also limit intake of sweets, sugar-sweetened beverages and red meats.      Information obtained from the American College of Cardiology and the American Heart Association.      Plan of care reviewed with patient at the conclusion of today's visit. Education was provided regarding diagnosis, management and any prescribed or recommended OTC medications.  Patient verbalizes understanding of and agreement with management plan.    Return in about 3 months (around 9/19/2020).    *Note that portions of this note were completed with a voice recognition  program.  Efforts were made to edit the dictation but occasionally words are transcribed.    SHAYAN Chopra          Answers for HPI/ROS submitted by the patient on 6/19/2020   What is the primary reason for your visit?: Other  Please describe your symptoms.: Need to get my meds filled  Have you had these symptoms before?: No  How long have you been having these symptoms?: 1-4 days

## 2020-06-19 NOTE — PATIENT INSTRUCTIONS
Return in the next few weeks for fasting labs.  Orders placed today.    Refill for ambien 10mg.  Discussed trying 1/2 tablet and RemFresh.  This patient is on a controlled substance which improves symptoms/quality of life and is aware of the risks, benefits and possible side-effects current treatment. The patient denies any medication side-effects at this time. A controlled substance agreement will be obtained or is currently on file. We reviewed required monitoring for controlled substances including but not limited to quarterly follow-up visits, annual depression screening, and urine drug screens to which the patient is agreeable. A CHARISSE report has been or shortly will be reviewed. There are no signs of deviation or misuse.     Hyperlipidemia:  Continue your medications as directed.  Exercise at least 30 min 3-4 days per week.  Goal for 150 min/week total.   •Diet is a vital tool for lowering cholesterol and blood pressure levels, which are two major risk factors for cardiovascular disease.   •Patients with high cholesterol and high blood pressure levels should eat plenty of vegetables, fruits and whole grains and incorporate low-fat dairy products, poultry, fish, legumes, non-tropical vegetable oils and nuts into their diet. They should also limit intake of sweets, sugar-sweetened beverages and red meats.      Information obtained from the American College of Cardiology and the American Heart Association.

## 2020-07-06 ENCOUNTER — LAB (OUTPATIENT)
Dept: LAB | Facility: HOSPITAL | Age: 55
End: 2020-07-06

## 2020-07-06 DIAGNOSIS — Z12.5 SCREENING PSA (PROSTATE SPECIFIC ANTIGEN): ICD-10-CM

## 2020-07-06 DIAGNOSIS — E78.2 MIXED HYPERLIPIDEMIA: ICD-10-CM

## 2020-07-06 DIAGNOSIS — R73.01 IMPAIRED FASTING GLUCOSE: ICD-10-CM

## 2020-07-06 LAB
ALBUMIN SERPL-MCNC: 4.7 G/DL (ref 3.5–5.2)
ALBUMIN UR-MCNC: <1.2 MG/DL
ALBUMIN/GLOB SERPL: 2 G/DL
ALP SERPL-CCNC: 66 U/L (ref 39–117)
ALT SERPL W P-5'-P-CCNC: 37 U/L (ref 1–41)
ANION GAP SERPL CALCULATED.3IONS-SCNC: 9.9 MMOL/L (ref 5–15)
AST SERPL-CCNC: 20 U/L (ref 1–40)
BASOPHILS # BLD AUTO: 0.03 10*3/MM3 (ref 0–0.2)
BASOPHILS NFR BLD AUTO: 0.4 % (ref 0–1.5)
BILIRUB SERPL-MCNC: 0.4 MG/DL (ref 0–1.2)
BUN SERPL-MCNC: 12 MG/DL (ref 6–20)
BUN/CREAT SERPL: 9.8 (ref 7–25)
CALCIUM SPEC-SCNC: 9.4 MG/DL (ref 8.6–10.5)
CHLORIDE SERPL-SCNC: 104 MMOL/L (ref 98–107)
CHOLEST SERPL-MCNC: 131 MG/DL (ref 0–200)
CO2 SERPL-SCNC: 26.1 MMOL/L (ref 22–29)
CREAT SERPL-MCNC: 1.23 MG/DL (ref 0.76–1.27)
CREAT UR-MCNC: 108.4 MG/DL
DEPRECATED RDW RBC AUTO: 39.4 FL (ref 37–54)
EOSINOPHIL # BLD AUTO: 0.13 10*3/MM3 (ref 0–0.4)
EOSINOPHIL NFR BLD AUTO: 1.8 % (ref 0.3–6.2)
ERYTHROCYTE [DISTWIDTH] IN BLOOD BY AUTOMATED COUNT: 12 % (ref 12.3–15.4)
GFR SERPL CREATININE-BSD FRML MDRD: 62 ML/MIN/1.73
GLOBULIN UR ELPH-MCNC: 2.3 GM/DL
GLUCOSE SERPL-MCNC: 111 MG/DL (ref 65–99)
HBA1C MFR BLD: 6 % (ref 4.8–5.6)
HCT VFR BLD AUTO: 40.6 % (ref 37.5–51)
HDLC SERPL-MCNC: 33 MG/DL (ref 40–60)
HGB BLD-MCNC: 14.2 G/DL (ref 13–17.7)
IMM GRANULOCYTES # BLD AUTO: 0.06 10*3/MM3 (ref 0–0.05)
IMM GRANULOCYTES NFR BLD AUTO: 0.8 % (ref 0–0.5)
LDLC SERPL CALC-MCNC: 71 MG/DL (ref 0–100)
LDLC/HDLC SERPL: 2.15 {RATIO}
LYMPHOCYTES # BLD AUTO: 1.99 10*3/MM3 (ref 0.7–3.1)
LYMPHOCYTES NFR BLD AUTO: 28.2 % (ref 19.6–45.3)
MCH RBC QN AUTO: 31.6 PG (ref 26.6–33)
MCHC RBC AUTO-ENTMCNC: 35 G/DL (ref 31.5–35.7)
MCV RBC AUTO: 90.2 FL (ref 79–97)
MICROALBUMIN/CREAT UR: NORMAL MG/G{CREAT}
MONOCYTES # BLD AUTO: 0.48 10*3/MM3 (ref 0.1–0.9)
MONOCYTES NFR BLD AUTO: 6.8 % (ref 5–12)
NEUTROPHILS NFR BLD AUTO: 4.37 10*3/MM3 (ref 1.7–7)
NEUTROPHILS NFR BLD AUTO: 62 % (ref 42.7–76)
NRBC BLD AUTO-RTO: 0 /100 WBC (ref 0–0.2)
PLATELET # BLD AUTO: 246 10*3/MM3 (ref 140–450)
PMV BLD AUTO: 10.1 FL (ref 6–12)
POTASSIUM SERPL-SCNC: 4.1 MMOL/L (ref 3.5–5.2)
PROT SERPL-MCNC: 7 G/DL (ref 6–8.5)
PSA SERPL-MCNC: 0.54 NG/ML (ref 0–4)
RBC # BLD AUTO: 4.5 10*6/MM3 (ref 4.14–5.8)
SODIUM SERPL-SCNC: 140 MMOL/L (ref 136–145)
TRIGL SERPL-MCNC: 136 MG/DL (ref 0–150)
TSH SERPL DL<=0.05 MIU/L-ACNC: 2.96 UIU/ML (ref 0.27–4.2)
VLDLC SERPL-MCNC: 27.2 MG/DL (ref 5–40)
WBC # BLD AUTO: 7.06 10*3/MM3 (ref 3.4–10.8)

## 2020-07-06 PROCEDURE — 85025 COMPLETE CBC W/AUTO DIFF WBC: CPT

## 2020-07-06 PROCEDURE — 80061 LIPID PANEL: CPT

## 2020-07-06 PROCEDURE — 82043 UR ALBUMIN QUANTITATIVE: CPT

## 2020-07-06 PROCEDURE — 80053 COMPREHEN METABOLIC PANEL: CPT

## 2020-07-06 PROCEDURE — 82570 ASSAY OF URINE CREATININE: CPT

## 2020-07-06 PROCEDURE — 84443 ASSAY THYROID STIM HORMONE: CPT

## 2020-07-06 PROCEDURE — G0103 PSA SCREENING: HCPCS

## 2020-07-06 PROCEDURE — 83036 HEMOGLOBIN GLYCOSYLATED A1C: CPT

## 2020-08-05 ENCOUNTER — OFFICE VISIT (OUTPATIENT)
Dept: INTERNAL MEDICINE | Facility: CLINIC | Age: 55
End: 2020-08-05

## 2020-08-05 VITALS
SYSTOLIC BLOOD PRESSURE: 114 MMHG | HEART RATE: 76 BPM | HEIGHT: 70 IN | BODY MASS INDEX: 32.5 KG/M2 | WEIGHT: 227 LBS | TEMPERATURE: 97.1 F | DIASTOLIC BLOOD PRESSURE: 78 MMHG

## 2020-08-05 DIAGNOSIS — L98.9 SKIN LESION OF FACE: Primary | ICD-10-CM

## 2020-08-05 DIAGNOSIS — R22.43 NODULE OF SKIN OF BOTH LOWER LEGS: ICD-10-CM

## 2020-08-05 PROCEDURE — 99213 OFFICE O/P EST LOW 20 MIN: CPT | Performed by: NURSE PRACTITIONER

## 2020-08-05 NOTE — PROGRESS NOTES
Braydon Eugene  1965  [id number]  Patient Care Team:  Cele Dos Santos APRN as PCP - General (Internal Medicine)    Braydon Eugene is a pleasant 55 y.o. male who presents for evaluation of Skin problem (Couple spots looked at on face and leg )    Chief Complaint   Patient presents with   • Skin problem     Couple spots looked at on face and leg        HPI:   Skin leison left cheek in beard line since Dec, dark, pinpoint, subdermal, tender.  Bothersome daily.    Also red/brown skin nodules RLE , and left knee present for years.  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  Family History   Problem Relation Age of Onset   • Cancer Mother         lung and ovarian      Social History     Tobacco Use   Smoking Status Never Smoker   Smokeless Tobacco Never Used     Allergies   Allergen Reactions   • Tetanus Toxoids Hives       Current Outpatient Medications:   •  atorvastatin (LIPITOR) 20 MG tablet, Take 1 tablet by mouth Daily., Disp: 90 tablet, Rfl: 0  •  doxazosin (CARDURA) 2 MG tablet, Take 1 tablet by mouth Daily., Disp: 90 tablet, Rfl: 0  •  zolpidem (AMBIEN) 10 MG tablet, Take 1 tablet by mouth At Night As Needed for Sleep., Disp: 30 tablet, Rfl: 2    Review of Systems   Constitutional: Negative for chills, fatigue and fever.   HENT: Negative for congestion, ear pain and sinus pressure.    Respiratory: Negative for cough, chest tightness, shortness of breath and wheezing.    Cardiovascular: Negative for chest pain and palpitations.   Gastrointestinal: Negative for abdominal pain, blood in stool and constipation.   Skin: Negative for color change.   Allergic/Immunologic: Negative for environmental allergies.   Neurological: Negative for dizziness, speech difficulty and headache.   Psychiatric/Behavioral: Negative for decreased concentration. The patient is not nervous/anxious.      /78  "(BP Location: Left arm, Patient Position: Sitting, Cuff Size: Large Adult)   Pulse 76   Temp 97.1 °F (36.2 °C) (Temporal)   Ht 178 cm (70.08\")   Wt 103 kg (227 lb)   BMI 32.50 kg/m²     Physical Exam   Constitutional: He appears well-developed and well-nourished.   HENT:   Head:       Pulmonary/Chest: Effort normal.   Neurological: He is alert.   Skin: Skin is warm and dry.   Psychiatric: He has a normal mood and affect. His behavior is normal. Judgment and thought content normal.   Vitals reviewed.      Results Review:  None    Assessment/Plan:  Braydon was seen today for skin problem.    Diagnoses and all orders for this visit:    Skin lesion of face  Comments:  refer to derm  Orders:  -     Ambulatory Referral to Dermatology    Nodule of skin of both lower legs       There are no Patient Instructions on file for this visit.  Plan of care reviewed with patient at the conclusion of today's visit. Education was provided regarding diagnosis, management and any prescribed or recommended OTC medications.  Patient verbalizes understanding of and agreement with management plan.    Return if symptoms worsen or fail to improve.    *Note that portions of this note were completed with a voice recognition program.  Efforts were made to edit the dictation but occasionally words are transcribed.    SHAYAN Chopra          "

## 2020-09-21 ENCOUNTER — OFFICE VISIT (OUTPATIENT)
Dept: INTERNAL MEDICINE | Facility: CLINIC | Age: 55
End: 2020-09-21

## 2020-09-21 VITALS
DIASTOLIC BLOOD PRESSURE: 90 MMHG | TEMPERATURE: 97.1 F | WEIGHT: 227 LBS | HEART RATE: 72 BPM | SYSTOLIC BLOOD PRESSURE: 132 MMHG | HEIGHT: 70 IN | BODY MASS INDEX: 32.5 KG/M2

## 2020-09-21 DIAGNOSIS — F51.01 PRIMARY INSOMNIA: Primary | ICD-10-CM

## 2020-09-21 PROCEDURE — 99213 OFFICE O/P EST LOW 20 MIN: CPT | Performed by: NURSE PRACTITIONER

## 2020-09-21 NOTE — PATIENT INSTRUCTIONS
This patient is on a controlled substance which improves symptoms/quality of life and is aware of the risks, benefits and possible side-effects current treatment. The patient denies any medication side-effects at this time. A controlled substance agreement will be obtained or is currently on file. We reviewed required monitoring for controlled substances including but not limited to quarterly follow-up visits, annual depression screening, and urine drug screens to which the patient is agreeable. A CHARISSE report has been or shortly will be reviewed. There are no signs of deviation or misuse.       Avoid caffeine or reserve for morning only.  Avoid heavy late-night snacks (light snack at bedtime may help).  Avoid alcohol within 6 hours of bedtime.    Daily exercise improves quality of sleep and may be more effective than medication.  Avoid exercise within 4 hours of bedtime.

## 2020-09-21 NOTE — PROGRESS NOTES
Braydon Eugene  1965  [id number]  Patient Care Team:  Cele Dos Santos APRN as PCP - General (Internal Medicine)    Braydon Eugene is a pleasant 55 y.o. male who presents for evaluation of Hyperlipidemia      Chief Complaint   Patient presents with   • Hyperlipidemia       HPI:   Routine compliance visit for ambien.  Sleeps about 6-7 hours with some waking after a few hours and can go back to sleep easily.  Takes 10mg nightly for long time.  Did try twice to use Remfresh instead of ambien but did not get much sleep.    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 No pertinent surgical history.  Family History   Problem Relation Age of Onset   • Cancer Mother         lung and ovarian      Social History     Tobacco Use   Smoking Status Never Smoker   Smokeless Tobacco Current User   • Types: Snuff     Allergies   Allergen Reactions   • Tetanus Toxoids Hives       Current Outpatient Medications:   •  atorvastatin (LIPITOR) 20 MG tablet, Take 1 tablet by mouth Daily., Disp: 90 tablet, Rfl: 0  •  doxazosin (CARDURA) 2 MG tablet, Take 1 tablet by mouth Daily., Disp: 90 tablet, Rfl: 0  •  zolpidem (AMBIEN) 10 MG tablet, Take 1 tablet by mouth At Night As Needed for Sleep., Disp: 30 tablet, Rfl: 2    Review of Systems   Constitutional: Negative for chills, fatigue and fever.   HENT: Negative for congestion, ear pain and sinus pressure.    Respiratory: Negative for cough, chest tightness, shortness of breath and wheezing.    Cardiovascular: Negative for chest pain and palpitations.   Gastrointestinal: Negative for abdominal pain, blood in stool and constipation.   Skin: Negative for color change.   Allergic/Immunologic: Positive for environmental allergies.   Neurological: Negative for dizziness, speech difficulty and headache.   Psychiatric/Behavioral: Negative for decreased concentration. The  "patient is not nervous/anxious.      /90 (BP Location: Left arm, Patient Position: Sitting, Cuff Size: Large Adult)   Pulse 72   Temp 97.1 °F (36.2 °C) (Temporal)   Ht 178 cm (70.08\")   Wt 103 kg (227 lb)   BMI 32.50 kg/m²     Physical Exam  Constitutional:       Appearance: He is normal weight.   Neurological:      Mental Status: He is alert and oriented to person, place, and time.   Psychiatric:         Mood and Affect: Mood normal.         Behavior: Behavior normal.         Thought Content: Thought content normal.         Judgment: Judgment normal.         Results Review:  None    Assessment/Plan:  Braydon was seen today for hyperlipidemia.    Diagnoses and all orders for this visit:    Primary insomnia  Comments:  try 1/2 tab ambien with RemFresh on the weekends        Patient Instructions   This patient is on a controlled substance which improves symptoms/quality of life and is aware of the risks, benefits and possible side-effects current treatment. The patient denies any medication side-effects at this time. A controlled substance agreement will be obtained or is currently on file. We reviewed required monitoring for controlled substances including but not limited to quarterly follow-up visits, annual depression screening, and urine drug screens to which the patient is agreeable. A CHARISSE report has been or shortly will be reviewed. There are no signs of deviation or misuse.       Avoid caffeine or reserve for morning only.  Avoid heavy late-night snacks (light snack at bedtime may help).  Avoid alcohol within 6 hours of bedtime.    Daily exercise improves quality of sleep and may be more effective than medication.  Avoid exercise within 4 hours of bedtime.      Plan of care reviewed with patient at the conclusion of today's visit. Education was provided regarding diagnosis, management and any prescribed or recommended OTC medications.  Patient verbalizes understanding of and agreement with management " plan.    Return in about 3 months (around 12/21/2020) for Next scheduled follow up.    *Note that portions of this note were completed with a voice recognition program.  Efforts were made to edit the dictation but occasionally words are transcribed.    Cele Dos Santos, APRN

## 2020-10-01 DIAGNOSIS — G47.00 INSOMNIA, UNSPECIFIED TYPE: ICD-10-CM

## 2020-10-01 RX ORDER — ZOLPIDEM TARTRATE 10 MG/1
10 TABLET ORAL NIGHTLY PRN
Qty: 30 TABLET | Refills: 0 | Status: SHIPPED | OUTPATIENT
Start: 2020-10-01 | End: 2020-11-10 | Stop reason: SDUPTHER

## 2020-10-05 ENCOUNTER — PRIOR AUTHORIZATION (OUTPATIENT)
Dept: INTERNAL MEDICINE | Facility: CLINIC | Age: 55
End: 2020-10-05

## 2020-11-10 DIAGNOSIS — G47.00 INSOMNIA, UNSPECIFIED TYPE: ICD-10-CM

## 2020-11-10 RX ORDER — ZOLPIDEM TARTRATE 10 MG/1
10 TABLET ORAL NIGHTLY PRN
Qty: 30 TABLET | Refills: 0 | Status: SHIPPED | OUTPATIENT
Start: 2020-11-10 | End: 2020-12-11

## 2020-12-11 DIAGNOSIS — G47.00 INSOMNIA, UNSPECIFIED TYPE: ICD-10-CM

## 2020-12-11 RX ORDER — ZOLPIDEM TARTRATE 10 MG/1
10 TABLET ORAL NIGHTLY PRN
Qty: 30 TABLET | Refills: 2 | Status: SHIPPED | OUTPATIENT
Start: 2020-12-11 | End: 2021-04-05 | Stop reason: SDUPTHER

## 2021-01-05 ENCOUNTER — OFFICE VISIT (OUTPATIENT)
Dept: INTERNAL MEDICINE | Facility: CLINIC | Age: 56
End: 2021-01-05

## 2021-01-05 VITALS
TEMPERATURE: 97.8 F | HEART RATE: 70 BPM | WEIGHT: 225 LBS | DIASTOLIC BLOOD PRESSURE: 70 MMHG | BODY MASS INDEX: 32.21 KG/M2 | HEIGHT: 70 IN | SYSTOLIC BLOOD PRESSURE: 118 MMHG

## 2021-01-05 DIAGNOSIS — E78.2 MIXED HYPERLIPIDEMIA: Primary | ICD-10-CM

## 2021-01-05 DIAGNOSIS — F51.01 PRIMARY INSOMNIA: ICD-10-CM

## 2021-01-05 PROCEDURE — 99214 OFFICE O/P EST MOD 30 MIN: CPT | Performed by: NURSE PRACTITIONER

## 2021-01-05 NOTE — PATIENT INSTRUCTIONS
Continue ambien  This patient is on a controlled substance which improves symptoms/quality of life and is aware of the risks, benefits and possible side-effects current treatment. The patient denies any medication side-effects at this time. A controlled substance agreement will be obtained or is currently on file. We reviewed required monitoring for controlled substances including but not limited to quarterly follow-up visits, annual depression screening, and urine drug screens to which the patient is agreeable. A CHARISSE report has been or shortly will be reviewed. There are no signs of deviation or misuse.

## 2021-01-05 NOTE — PROGRESS NOTES
Braydon Eugene  1965  [id number]  Patient Care Team:  Cele Dos Santos APRN as PCP - General (Internal Medicine)    Braydon Eugene is a pleasant 55 y.o. male who presents for evaluation of Hyperlipidemia and Insomnia (improved some with med)    Chief Complaint   Patient presents with   • Hyperlipidemia   • Insomnia     improved some with med       HPI:   Routine compliance visit for ambien refill, doing well on meds for years, no side effects.  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 No pertinent surgical history.  Family History   Problem Relation Age of Onset   • Cancer Mother         lung and ovarian      Social History     Tobacco Use   Smoking Status Never Smoker   Smokeless Tobacco Current User   • Types: Snuff     Allergies   Allergen Reactions   • Tetanus Toxoids Hives       Current Outpatient Medications:   •  atorvastatin (LIPITOR) 20 MG tablet, Take 1 tablet by mouth Daily., Disp: 90 tablet, Rfl: 0  •  doxazosin (CARDURA) 2 MG tablet, Take 1 tablet by mouth Daily., Disp: 90 tablet, Rfl: 0  •  zolpidem (AMBIEN) 10 MG tablet, TAKE 1 TABLET BY MOUTH AT NIGHT AS NEEDED FOR SLEEP, Disp: 30 tablet, Rfl: 2    Review of Systems   Constitutional: Negative for chills, fatigue and fever.   HENT: Negative for congestion, ear pain and sinus pressure.    Respiratory: Negative for cough, chest tightness, shortness of breath and wheezing.    Cardiovascular: Negative for chest pain and palpitations.   Gastrointestinal: Negative for abdominal pain, blood in stool and constipation.   Skin: Negative for color change.   Allergic/Immunologic: Negative for environmental allergies.   Neurological: Negative for dizziness, speech difficulty and headache.   Psychiatric/Behavioral: Negative for decreased concentration. The patient is not  "nervous/anxious.      /70 (BP Location: Left arm, Patient Position: Sitting, Cuff Size: Large Adult)   Pulse 70   Temp 97.8 °F (36.6 °C) (Infrared)   Ht 178 cm (70.08\")   Wt 102 kg (225 lb)   BMI 32.21 kg/m²     Physical Exam  Vitals signs reviewed.   Constitutional:       Appearance: He is well-developed.   Pulmonary:      Effort: Pulmonary effort is normal.   Skin:     General: Skin is warm and dry.   Neurological:      Mental Status: He is alert.   Psychiatric:         Mood and Affect: Mood normal.         Behavior: Behavior normal.         Thought Content: Thought content normal.         Judgment: Judgment normal.         Procedures    Results Review:  None    PHQ-9 Total Score: 0    Assessment/Plan:  Diagnoses and all orders for this visit:    1. Mixed hyperlipidemia (Primary)  Comments:  continue statin, labs today  Orders:  -     Comprehensive Metabolic Panel; Future  -     Lipid Panel; Future    2. Primary insomnia  Comments:  refill ambien       There are no Patient Instructions on file for this visit.  Plan of care reviewed with patient at the conclusion of today's visit. Education was provided regarding diagnosis, management and any prescribed or recommended OTC medications.  Patient verbalizes understanding of and agreement with management plan.    Return in about 3 months (around 4/5/2021) for Recheck.    *Note that portions of this note were completed with a voice recognition program.  Efforts were made to edit the dictation but occasionally words are transcribed.    SHAYAN Chopra          "

## 2021-04-05 ENCOUNTER — OFFICE VISIT (OUTPATIENT)
Dept: INTERNAL MEDICINE | Facility: CLINIC | Age: 56
End: 2021-04-05

## 2021-04-05 VITALS
WEIGHT: 230 LBS | BODY MASS INDEX: 32.93 KG/M2 | HEART RATE: 72 BPM | SYSTOLIC BLOOD PRESSURE: 128 MMHG | DIASTOLIC BLOOD PRESSURE: 80 MMHG | TEMPERATURE: 97.3 F | HEIGHT: 70 IN

## 2021-04-05 DIAGNOSIS — G47.00 INSOMNIA, UNSPECIFIED TYPE: ICD-10-CM

## 2021-04-05 PROCEDURE — 99213 OFFICE O/P EST LOW 20 MIN: CPT | Performed by: NURSE PRACTITIONER

## 2021-04-05 RX ORDER — DOXAZOSIN 2 MG/1
2 TABLET ORAL DAILY
Qty: 90 TABLET | Refills: 1 | Status: SHIPPED | OUTPATIENT
Start: 2021-04-05 | End: 2021-10-25

## 2021-04-05 RX ORDER — ZOLPIDEM TARTRATE 10 MG/1
10 TABLET ORAL NIGHTLY PRN
Qty: 30 TABLET | Refills: 2 | Status: SHIPPED | OUTPATIENT
Start: 2021-04-05 | End: 2021-07-12

## 2021-04-05 RX ORDER — ATORVASTATIN CALCIUM 20 MG/1
20 TABLET, FILM COATED ORAL DAILY
Qty: 90 TABLET | Refills: 11 | Status: SHIPPED | OUTPATIENT
Start: 2021-04-05 | End: 2022-05-04

## 2021-04-05 NOTE — PATIENT INSTRUCTIONS
This patient is on a controlled substance which improves symptoms/quality of life and is aware of the risks, benefits and possible side-effects current treatment. The patient denies any medication side-effects at this time. A controlled substance agreement will be obtained or is currently on file. We reviewed required monitoring for controlled substances including but not limited to quarterly follow-up visits, annual depression screening, and urine drug screens to which the patient is agreeable. A CHARISSE report has been or shortly will be reviewed. There are no signs of deviation or misuse.

## 2021-04-05 NOTE — PROGRESS NOTES
Braydon Eugene  1965  [id number]  Patient Care Team:  Cele Dos Santos APRN as PCP - General (Internal Medicine)    Braydon Eugene is a pleasant 56 y.o. 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 No pertinent surgical history.  Family History   Problem Relation Age of Onset   • Cancer Mother         lung and ovarian      Social History     Tobacco Use   Smoking Status Never Smoker   Smokeless Tobacco Current User   • Types: Snuff     Allergies   Allergen Reactions   • Tetanus Toxoids Hives       Current Outpatient Medications:   •  atorvastatin (LIPITOR) 20 MG tablet, Take 1 tablet by mouth Daily., Disp: 90 tablet, Rfl: 11  •  doxazosin (CARDURA) 2 MG tablet, Take 1 tablet by mouth Daily., Disp: 90 tablet, Rfl: 1  •  zolpidem (AMBIEN) 10 MG tablet, Take 1 tablet by mouth At Night As Needed for Sleep., Disp: 30 tablet, Rfl: 2    Review of Systems   Constitutional: Negative for chills, fatigue and fever.   HENT: Negative for congestion, ear pain and sinus pressure.    Respiratory: Negative for cough, chest tightness, shortness of breath and wheezing.    Cardiovascular: Negative for chest pain and palpitations.   Gastrointestinal: Negative for abdominal pain, blood in stool and constipation.   Skin: Negative for color change.   Allergic/Immunologic: Negative for environmental allergies.   Neurological: Negative for dizziness, speech difficulty and headache.   Psychiatric/Behavioral: Negative for decreased concentration. The patient is not nervous/anxious.      /80 (BP Location: Left arm, Patient Position: Sitting, Cuff Size: Large Adult)   Pulse 72   Temp 97.3 °F (36.3 °C) (Temporal)   Ht 178 cm  "(70.08\")   Wt 104 kg (230 lb)   BMI 32.93 kg/m²     Physical Exam  Vitals reviewed.   Constitutional:       Appearance: He is well-developed.   Pulmonary:      Effort: Pulmonary effort is normal.   Skin:     General: Skin is warm and dry.   Neurological:      Mental Status: He is alert.   Psychiatric:         Behavior: Behavior normal.         Procedures    Results Review:  None    PHQ-9 Total Score:      Assessment/Plan:  Diagnoses and all orders for this visit:    1. Insomnia, unspecified type  Comments:  refill ambien  Orders:  -     zolpidem (AMBIEN) 10 MG tablet; Take 1 tablet by mouth At Night As Needed for Sleep.  Dispense: 30 tablet; Refill: 2    Other orders  -     atorvastatin (LIPITOR) 20 MG tablet; Take 1 tablet by mouth Daily.  Dispense: 90 tablet; Refill: 11  -     doxazosin (CARDURA) 2 MG tablet; Take 1 tablet by mouth Daily.  Dispense: 90 tablet; Refill: 1       Patient Instructions   This patient is on a controlled substance which improves symptoms/quality of life and is aware of the risks, benefits and possible side-effects current treatment. The patient denies any medication side-effects at this time. A controlled substance agreement will be obtained or is currently on file. We reviewed required monitoring for controlled substances including but not limited to quarterly follow-up visits, annual depression screening, and urine drug screens to which the patient is agreeable. A CHARISSE report has been or shortly will be reviewed. There are no signs of deviation or misuse.       Plan of care reviewed with patient at the conclusion of today's visit. Education was provided regarding diagnosis, management and any prescribed or recommended OTC medications.  Patient verbalizes understanding of and agreement with management plan.    Return in about 3 months (around 7/5/2021) for Annual physical, Labs next visit.    *Note that portions of this note were completed with a voice recognition program.  Efforts " were made to edit the dictation but occasionally words are transcribed.    Cele Dos Santos, APRN

## 2021-07-12 DIAGNOSIS — G47.00 INSOMNIA, UNSPECIFIED TYPE: ICD-10-CM

## 2021-07-12 RX ORDER — ZOLPIDEM TARTRATE 10 MG/1
10 TABLET ORAL NIGHTLY PRN
Qty: 30 TABLET | Refills: 0 | Status: SHIPPED | OUTPATIENT
Start: 2021-07-12 | End: 2021-08-16

## 2021-07-14 DIAGNOSIS — G47.00 INSOMNIA, UNSPECIFIED TYPE: ICD-10-CM

## 2021-07-14 RX ORDER — ZOLPIDEM TARTRATE 10 MG/1
10 TABLET ORAL NIGHTLY PRN
Qty: 30 TABLET | Refills: 0 | Status: CANCELLED | OUTPATIENT
Start: 2021-07-14

## 2021-07-14 NOTE — TELEPHONE ENCOUNTER
LS : 4/5/21    Called talked to Mrs Peralta informed her that the medication was sent in on 7/12/21  She verbalized understanding

## 2021-07-14 NOTE — TELEPHONE ENCOUNTER
Caller: Betzaida Eugene    Relationship: Emergency Contact    Best call back number: [phone]    Medication needed:   Requested Prescriptions     Pending Prescriptions Disp Refills   • zolpidem (AMBIEN) 10 MG tablet 30 tablet 0     Sig: Take 1 tablet by mouth At Night As Needed for Sleep.       When do you need the refill by: ASAP    What additional details did the patient provide when requesting the medication: BETZAIDA ADVISED PATIENT HAS BEEN WITHOUT MEDICATION 2 DAYS AND ITS NOT AT THE PHARMACY    Does the patient have less than a 3 day supply:  [x] Yes  [] No    What is the patient's preferred pharmacy:      [facility]

## 2021-07-20 ENCOUNTER — OFFICE VISIT (OUTPATIENT)
Dept: INTERNAL MEDICINE | Facility: CLINIC | Age: 56
End: 2021-07-20

## 2021-07-20 ENCOUNTER — LAB (OUTPATIENT)
Dept: LAB | Facility: HOSPITAL | Age: 56
End: 2021-07-20

## 2021-07-20 VITALS
DIASTOLIC BLOOD PRESSURE: 74 MMHG | HEIGHT: 70 IN | SYSTOLIC BLOOD PRESSURE: 102 MMHG | BODY MASS INDEX: 32.78 KG/M2 | WEIGHT: 229 LBS | TEMPERATURE: 97.1 F | HEART RATE: 76 BPM

## 2021-07-20 DIAGNOSIS — F51.01 PRIMARY INSOMNIA: ICD-10-CM

## 2021-07-20 DIAGNOSIS — Z79.899 LONG-TERM USE OF HIGH-RISK MEDICATION: ICD-10-CM

## 2021-07-20 DIAGNOSIS — E78.2 MIXED HYPERLIPIDEMIA: ICD-10-CM

## 2021-07-20 DIAGNOSIS — Z00.00 ROUTINE MEDICAL EXAM: Primary | ICD-10-CM

## 2021-07-20 DIAGNOSIS — Z12.5 SCREENING PSA (PROSTATE SPECIFIC ANTIGEN): ICD-10-CM

## 2021-07-20 DIAGNOSIS — R73.01 IMPAIRED FASTING GLUCOSE: ICD-10-CM

## 2021-07-20 DIAGNOSIS — N40.0 BENIGN PROSTATIC HYPERPLASIA WITHOUT LOWER URINARY TRACT SYMPTOMS: ICD-10-CM

## 2021-07-20 LAB
ALBUMIN SERPL-MCNC: 5 G/DL (ref 3.5–5.2)
ALBUMIN UR-MCNC: 1.8 MG/DL
ALBUMIN/GLOB SERPL: 2.1 G/DL
ALP SERPL-CCNC: 74 U/L (ref 39–117)
ALT SERPL W P-5'-P-CCNC: 48 U/L (ref 1–41)
AMPHET+METHAMPHET UR QL: NEGATIVE
AMPHETAMINES UR QL: NEGATIVE
ANION GAP SERPL CALCULATED.3IONS-SCNC: 10.8 MMOL/L (ref 5–15)
AST SERPL-CCNC: 35 U/L (ref 1–40)
BARBITURATES UR QL SCN: NEGATIVE
BASOPHILS # BLD AUTO: 0.04 10*3/MM3 (ref 0–0.2)
BASOPHILS NFR BLD AUTO: 0.6 % (ref 0–1.5)
BENZODIAZ UR QL SCN: NEGATIVE
BILIRUB SERPL-MCNC: 0.4 MG/DL (ref 0–1.2)
BUN SERPL-MCNC: 12 MG/DL (ref 6–20)
BUN/CREAT SERPL: 10.5 (ref 7–25)
BUPRENORPHINE SERPL-MCNC: NEGATIVE NG/ML
CALCIUM SPEC-SCNC: 9.3 MG/DL (ref 8.6–10.5)
CANNABINOIDS SERPL QL: NEGATIVE
CHLORIDE SERPL-SCNC: 102 MMOL/L (ref 98–107)
CHOLEST SERPL-MCNC: 140 MG/DL (ref 0–200)
CO2 SERPL-SCNC: 26.2 MMOL/L (ref 22–29)
COCAINE UR QL: NEGATIVE
CREAT SERPL-MCNC: 1.14 MG/DL (ref 0.76–1.27)
CREAT UR-MCNC: 306.3 MG/DL
DEPRECATED RDW RBC AUTO: 39.1 FL (ref 37–54)
EOSINOPHIL # BLD AUTO: 0.09 10*3/MM3 (ref 0–0.4)
EOSINOPHIL NFR BLD AUTO: 1.4 % (ref 0.3–6.2)
ERYTHROCYTE [DISTWIDTH] IN BLOOD BY AUTOMATED COUNT: 12.1 % (ref 12.3–15.4)
GFR SERPL CREATININE-BSD FRML MDRD: 67 ML/MIN/1.73
GLOBULIN UR ELPH-MCNC: 2.4 GM/DL
GLUCOSE SERPL-MCNC: 107 MG/DL (ref 65–99)
HBA1C MFR BLD: 5.9 % (ref 4.8–5.6)
HCT VFR BLD AUTO: 41.8 % (ref 37.5–51)
HDLC SERPL-MCNC: 31 MG/DL (ref 40–60)
HGB BLD-MCNC: 14.3 G/DL (ref 13–17.7)
IMM GRANULOCYTES # BLD AUTO: 0.03 10*3/MM3 (ref 0–0.05)
IMM GRANULOCYTES NFR BLD AUTO: 0.5 % (ref 0–0.5)
LDLC SERPL CALC-MCNC: 90 MG/DL (ref 0–100)
LDLC/HDLC SERPL: 2.88 {RATIO}
LYMPHOCYTES # BLD AUTO: 1.69 10*3/MM3 (ref 0.7–3.1)
LYMPHOCYTES NFR BLD AUTO: 25.4 % (ref 19.6–45.3)
MCH RBC QN AUTO: 30.6 PG (ref 26.6–33)
MCHC RBC AUTO-ENTMCNC: 34.2 G/DL (ref 31.5–35.7)
MCV RBC AUTO: 89.5 FL (ref 79–97)
METHADONE UR QL SCN: NEGATIVE
MICROALBUMIN/CREAT UR: 5.9 MG/G
MONOCYTES # BLD AUTO: 0.53 10*3/MM3 (ref 0.1–0.9)
MONOCYTES NFR BLD AUTO: 8 % (ref 5–12)
NEUTROPHILS NFR BLD AUTO: 4.28 10*3/MM3 (ref 1.7–7)
NEUTROPHILS NFR BLD AUTO: 64.1 % (ref 42.7–76)
NRBC BLD AUTO-RTO: 0 /100 WBC (ref 0–0.2)
OPIATES UR QL: NEGATIVE
OXYCODONE UR QL SCN: NEGATIVE
PCP UR QL SCN: NEGATIVE
PLATELET # BLD AUTO: 265 10*3/MM3 (ref 140–450)
PMV BLD AUTO: 10 FL (ref 6–12)
POTASSIUM SERPL-SCNC: 4.3 MMOL/L (ref 3.5–5.2)
PROPOXYPH UR QL: NEGATIVE
PROT SERPL-MCNC: 7.4 G/DL (ref 6–8.5)
PSA SERPL-MCNC: 0.5 NG/ML (ref 0–4)
RBC # BLD AUTO: 4.67 10*6/MM3 (ref 4.14–5.8)
SODIUM SERPL-SCNC: 139 MMOL/L (ref 136–145)
TRICYCLICS UR QL SCN: NEGATIVE
TRIGL SERPL-MCNC: 98 MG/DL (ref 0–150)
TSH SERPL DL<=0.05 MIU/L-ACNC: 2.88 UIU/ML (ref 0.27–4.2)
VLDLC SERPL-MCNC: 19 MG/DL (ref 5–40)
WBC # BLD AUTO: 6.66 10*3/MM3 (ref 3.4–10.8)

## 2021-07-20 PROCEDURE — 80061 LIPID PANEL: CPT

## 2021-07-20 PROCEDURE — 85025 COMPLETE CBC W/AUTO DIFF WBC: CPT

## 2021-07-20 PROCEDURE — G0103 PSA SCREENING: HCPCS

## 2021-07-20 PROCEDURE — 80306 DRUG TEST PRSMV INSTRMNT: CPT

## 2021-07-20 PROCEDURE — 83036 HEMOGLOBIN GLYCOSYLATED A1C: CPT

## 2021-07-20 PROCEDURE — 82570 ASSAY OF URINE CREATININE: CPT

## 2021-07-20 PROCEDURE — 99396 PREV VISIT EST AGE 40-64: CPT | Performed by: NURSE PRACTITIONER

## 2021-07-20 PROCEDURE — 84443 ASSAY THYROID STIM HORMONE: CPT

## 2021-07-20 PROCEDURE — 82043 UR ALBUMIN QUANTITATIVE: CPT

## 2021-07-20 PROCEDURE — 80053 COMPREHEN METABOLIC PANEL: CPT

## 2021-07-20 NOTE — PATIENT INSTRUCTIONS
Preventive Care 40-64 Years Old, Male  Preventive care refers to lifestyle choices and visits with your health care provider that can promote health and wellness. This includes:  · A yearly physical exam. This is also called an annual well check.  · Regular dental and eye exams.  · Immunizations.  · Screening for certain conditions.  · Healthy lifestyle choices, such as eating a healthy diet, getting regular exercise, not using drugs or products that contain nicotine and tobacco, and limiting alcohol use.  What can I expect for my preventive care visit?  Physical exam  Your health care provider will check:  · Height and weight. These may be used to calculate body mass index (BMI), which is a measurement that tells if you are at a healthy weight.  · Heart rate and blood pressure.  · Your skin for abnormal spots.  Counseling  Your health care provider may ask you questions about:  · Alcohol, tobacco, and drug use.  · Emotional well-being.  · Home and relationship well-being.  · Sexual activity.  · Eating habits.  · Work and work environment.  What immunizations do I need?    Influenza (flu) vaccine  · This is recommended every year.  Tetanus, diphtheria, and pertussis (Tdap) vaccine  · You may need a Td booster every 10 years.  Varicella (chickenpox) vaccine  · You may need this vaccine if you have not already been vaccinated.  Zoster (shingles) vaccine  · You may need this after age 60.  Measles, mumps, and rubella (MMR) vaccine  · You may need at least one dose of MMR if you were born in 1957 or later. You may also need a second dose.  Pneumococcal conjugate (PCV13) vaccine  · You may need this if you have certain conditions and were not previously vaccinated.  Pneumococcal polysaccharide (PPSV23) vaccine  · You may need one or two doses if you smoke cigarettes or if you have certain conditions.  Meningococcal conjugate (MenACWY) vaccine  · You may need this if you have certain conditions.  Hepatitis A  vaccine  · You may need this if you have certain conditions or if you travel or work in places where you may be exposed to hepatitis A.  Hepatitis B vaccine  · You may need this if you have certain conditions or if you travel or work in places where you may be exposed to hepatitis B.  Haemophilus influenzae type b (Hib) vaccine  · You may need this if you have certain risk factors.  Human papillomavirus (HPV) vaccine  · If recommended by your health care provider, you may need three doses over 6 months.  You may receive vaccines as individual doses or as more than one vaccine together in one shot (combination vaccines). Talk with your health care provider about the risks and benefits of combination vaccines.  What tests do I need?  Blood tests  · Lipid and cholesterol levels. These may be checked every 5 years, or more frequently if you are over 50 years old.  · Hepatitis C test.  · Hepatitis B test.  Screening  · Lung cancer screening. You may have this screening every year starting at age 55 if you have a 30-pack-year history of smoking and currently smoke or have quit within the past 15 years.  · Prostate cancer screening. Recommendations will vary depending on your family history and other risks.  · Colorectal cancer screening. All adults should have this screening starting at age 50 and continuing until age 75. Your health care provider may recommend screening at age 45 if you are at increased risk. You will have tests every 1-10 years, depending on your results and the type of screening test.  · Diabetes screening. This is done by checking your blood sugar (glucose) after you have not eaten for a while (fasting). You may have this done every 1-3 years.  · Sexually transmitted disease (STD) testing.  Follow these instructions at home:  Eating and drinking  · Eat a diet that includes fresh fruits and vegetables, whole grains, lean protein, and low-fat dairy products.  · Take vitamin and mineral supplements as  recommended by your health care provider.  · Do not drink alcohol if your health care provider tells you not to drink.  · If you drink alcohol:  ? Limit how much you have to 0-2 drinks a day.  ? Be aware of how much alcohol is in your drink. In the [place] one drink equals one 12 oz bottle of beer (355 mL), one 5 oz glass of wine (148 mL), or one 1½ oz glass of hard liquor (44 mL).  Lifestyle  · Take daily care of your teeth and gums.  · Stay active. Exercise for at least 30 minutes on 5 or more days each week.  · Do not use any products that contain nicotine or tobacco, such as cigarettes, e-cigarettes, and chewing tobacco. If you need help quitting, ask your health care provider.  · If you are sexually active, practice safe sex. Use a condom or other form of protection to prevent STIs (sexually transmitted infections).  · Talk with your health care provider about taking a low-dose aspirin every day starting at age 50.  What's next?  · Go to your health care provider once a year for a well check visit.  · Ask your health care provider how often you should have your eyes and teeth checked.  · Stay up to date on all vaccines.  This information is not intended to replace advice given to you by your health care provider. Make sure you discuss any questions you have with your health care provider.  Document Revised: 12/12/2019 Document Reviewed: 12/12/2019  [email or web address] Patient Education © 2020 [employer]    This patient is on a controlled substance which improves symptoms/quality of life and is aware of the risks, benefits and possible side-effects current treatment. The patient denies any medication side-effects at this time. A controlled substance agreement will be obtained or is currently on file. We reviewed required monitoring for controlled substances including but not limited to quarterly follow-up visits, annual depression screening, and urine drug screens to which the patient is agreeable. A CHARISSE report has  "been or shortly will be reviewed. There are no signs of deviation or misuse.     The patient was counseled on goals and the need for weight reduction.   The body mass index (BMI), which is a ratio of weight to height, while not a perfect measurement of body fat, does correlate strongly with various metabolic and disease outcomes. A BMI in the 18.5 - 24.9 range is considered a normal or healthy weight. A BMI above 25 is considered overweight.  A BMI of 30 or above is considered obese.  A BMI of 35 and above with any chronic conditions (such as hypertension, hyperlipidemia or Coronary Artery Disease is considered morbidly obese. Even an initial 10% reduction in body weight can offer important health benefits and reduce long-term cardiovascular risk.     While there are an array of popular/fad diets being promoted, I generally recommend a diet of vegetables, fruits, whole grains, and lean protein sources including low-fat dairy products, poultry, and nuts with an emphasis toward minimizing intake of sweets, carbohydrates, and red meats. Obtaining 30-40 minutes of moderate to vigorous-intensity aerobic exercise on 4-5 days weekly will assist you in reaching your optimal weight, not to mention the cardiovascular benefit, although no amount of exercise will usually overcome the effects of a poor diet. (Moderate activity means you can talk, but not sing, while you are active.) While this may seem like an insurmountable task at first to some, adding 10 minutes of aerobic activity to your current amount of exercise gradually can help you reach the above goal. The National [place] [employer]'s \"Aim for a Healthy Weight\" website offers practical information on how you may improve your current diet and exercise habits. I have included the website's URL here for your reference: https://www.nhlbi.nih.gov/health/educational/lose_wt/index.htm. A nutritionist can help support you in developing an individualized diet plan. If you " are interested, I would be happy to place a referral. Just let me know.

## 2021-07-20 NOTE — PROGRESS NOTES
"Braydon Eugene  1965  [id number]  Patient Care Team:  Cele Dos Santos APRN as PCP - General (Internal Medicine)    Braydon Eugene is a 56 y.o. pleasant male here today for annual physical.  Chief Complaint   Patient presents with   • Annual Exam     Patient is fasting       HPI:   Braydon is here for routine physical.  He has a pertinent medical history of BPH, hyperlipidemia, and insomnia.  Works in construction daily and is physically active but no formal exercise.  Drinks Dt Coke avg 6-8 down form 18 per day. Sober since 2003  cologuard <3 yrs was neg, no blood in stool, change in BM.  Overall, feeling well with no current health concerns.  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 No pertinent surgical history.  Family History   Problem Relation Age of Onset   • Cancer Mother         lung and ovarian      Social History     Tobacco Use   Smoking Status Never Smoker   Smokeless Tobacco Current User   • Types: Snuff     Allergies   Allergen Reactions   • Tetanus Toxoids Hives       Current Outpatient Medications:   •  atorvastatin (LIPITOR) 20 MG tablet, Take 1 tablet by mouth Daily., Disp: 90 tablet, Rfl: 11  •  doxazosin (CARDURA) 2 MG tablet, Take 1 tablet by mouth Daily., Disp: 90 tablet, Rfl: 1  •  zolpidem (AMBIEN) 10 MG tablet, TAKE 1 TABLET BY MOUTH AT NIGHT AS NEEDED FOR SLEEP, Disp: 30 tablet, Rfl: 0    Review of Systems    /74 (BP Location: Left arm, Patient Position: Sitting, Cuff Size: Large Adult)   Pulse 76   Temp 97.1 °F (36.2 °C) (Temporal)   Ht 178 cm (70.08\")   Wt 104 kg (229 lb)   BMI 32.78 kg/m²     Physical Exam  Vitals reviewed.   Constitutional:       Appearance: Normal appearance. He is well-developed. He is obese.   HENT:      Head: Normocephalic.      Comments: *wearing mask     Right Ear: External ear normal.      Left Ear: External ear normal.   Eyes:      " Conjunctiva/sclera: Conjunctivae normal.      Pupils: Pupils are equal, round, and reactive to light.   Cardiovascular:      Rate and Rhythm: Normal rate and regular rhythm.      Pulses: Normal pulses.      Heart sounds: Normal heart sounds.   Pulmonary:      Effort: Pulmonary effort is normal.      Breath sounds: Normal breath sounds.   Abdominal:      General: Abdomen is flat. Bowel sounds are normal.      Palpations: Abdomen is soft.   Musculoskeletal:         General: Normal range of motion.      Cervical back: Normal range of motion and neck supple.   Skin:     General: Skin is warm and dry.   Neurological:      Mental Status: He is alert and oriented to person, place, and time.   Psychiatric:         Mood and Affect: Mood normal.         Behavior: Behavior normal.         Thought Content: Thought content normal.         Judgment: Judgment normal.         Results Review:  None    PHQ-9 Total Score:      Procedures     Assessment/Plan:  Diagnoses and all orders for this visit:    1. Routine medical exam (Primary)    2. Benign prostatic hyperplasia without lower urinary tract symptoms  Comments:  Continue doxazosin daily    3. Mixed hyperlipidemia  Comments:  Continue daily statin, fasting labs today  Orders:  -     CBC & Differential; Future  -     Comprehensive Metabolic Panel; Future  -     Lipid Panel; Future  -     TSH Rfx On Abnormal To Free T4; Future  -     Microalbumin / Creatinine Urine Ratio - Urine, Clean Catch; Future    4. Primary insomnia  Comments:  Continue Ambien nightly, UDS with labs today  Orders:  -     Urine Drug Screen - Urine, Clean Catch; Future    5. Impaired fasting glucose  -     Hemoglobin A1c; Future    6. Long-term use of high-risk medication  -     Urine Drug Screen - Urine, Clean Catch; Future    7. Screening PSA (prostate specific antigen)  -     PSA Screen; Future         Patient Instructions   Preventive Care 40-64 Years Old, Male  Preventive care refers to lifestyle choices  and visits with your health care provider that can promote health and wellness. This includes:  · A yearly physical exam. This is also called an annual well check.  · Regular dental and eye exams.  · Immunizations.  · Screening for certain conditions.  · Healthy lifestyle choices, such as eating a healthy diet, getting regular exercise, not using drugs or products that contain nicotine and tobacco, and limiting alcohol use.  What can I expect for my preventive care visit?  Physical exam  Your health care provider will check:  · Height and weight. These may be used to calculate body mass index (BMI), which is a measurement that tells if you are at a healthy weight.  · Heart rate and blood pressure.  · Your skin for abnormal spots.  Counseling  Your health care provider may ask you questions about:  · Alcohol, tobacco, and drug use.  · Emotional well-being.  · Home and relationship well-being.  · Sexual activity.  · Eating habits.  · Work and work environment.  What immunizations do I need?    Influenza (flu) vaccine  · This is recommended every year.  Tetanus, diphtheria, and pertussis (Tdap) vaccine  · You may need a Td booster every 10 years.  Varicella (chickenpox) vaccine  · You may need this vaccine if you have not already been vaccinated.  Zoster (shingles) vaccine  · You may need this after age 60.  Measles, mumps, and rubella (MMR) vaccine  · You may need at least one dose of MMR if you were born in 1957 or later. You may also need a second dose.  Pneumococcal conjugate (PCV13) vaccine  · You may need this if you have certain conditions and were not previously vaccinated.  Pneumococcal polysaccharide (PPSV23) vaccine  · You may need one or two doses if you smoke cigarettes or if you have certain conditions.  Meningococcal conjugate (MenACWY) vaccine  · You may need this if you have certain conditions.  Hepatitis A vaccine  · You may need this if you have certain conditions or if you travel or work in places  where you may be exposed to hepatitis A.  Hepatitis B vaccine  · You may need this if you have certain conditions or if you travel or work in places where you may be exposed to hepatitis B.  Haemophilus influenzae type b (Hib) vaccine  · You may need this if you have certain risk factors.  Human papillomavirus (HPV) vaccine  · If recommended by your health care provider, you may need three doses over 6 months.  You may receive vaccines as individual doses or as more than one vaccine together in one shot (combination vaccines). Talk with your health care provider about the risks and benefits of combination vaccines.  What tests do I need?  Blood tests  · Lipid and cholesterol levels. These may be checked every 5 years, or more frequently if you are over 50 years old.  · Hepatitis C test.  · Hepatitis B test.  Screening  · Lung cancer screening. You may have this screening every year starting at age 55 if you have a 30-pack-year history of smoking and currently smoke or have quit within the past 15 years.  · Prostate cancer screening. Recommendations will vary depending on your family history and other risks.  · Colorectal cancer screening. All adults should have this screening starting at age 50 and continuing until age 75. Your health care provider may recommend screening at age 45 if you are at increased risk. You will have tests every 1-10 years, depending on your results and the type of screening test.  · Diabetes screening. This is done by checking your blood sugar (glucose) after you have not eaten for a while (fasting). You may have this done every 1-3 years.  · Sexually transmitted disease (STD) testing.  Follow these instructions at home:  Eating and drinking  · Eat a diet that includes fresh fruits and vegetables, whole grains, lean protein, and low-fat dairy products.  · Take vitamin and mineral supplements as recommended by your health care provider.  · Do not drink alcohol if your health care provider  tells you not to drink.  · If you drink alcohol:  ? Limit how much you have to 0-2 drinks a day.  ? Be aware of how much alcohol is in your drink. In the [place] one drink equals one 12 oz bottle of beer (355 mL), one 5 oz glass of wine (148 mL), or one 1½ oz glass of hard liquor (44 mL).  Lifestyle  · Take daily care of your teeth and gums.  · Stay active. Exercise for at least 30 minutes on 5 or more days each week.  · Do not use any products that contain nicotine or tobacco, such as cigarettes, e-cigarettes, and chewing tobacco. If you need help quitting, ask your health care provider.  · If you are sexually active, practice safe sex. Use a condom or other form of protection to prevent STIs (sexually transmitted infections).  · Talk with your health care provider about taking a low-dose aspirin every day starting at age 50.  What's next?  · Go to your health care provider once a year for a well check visit.  · Ask your health care provider how often you should have your eyes and teeth checked.  · Stay up to date on all vaccines.  This information is not intended to replace advice given to you by your health care provider. Make sure you discuss any questions you have with your health care provider.  Document Revised: 12/12/2019 Document Reviewed: 12/12/2019  [employer] Patient Education © 2020 [employer]    This patient is on a controlled substance which improves symptoms/quality of life and is aware of the risks, benefits and possible side-effects current treatment. The patient denies any medication side-effects at this time. A controlled substance agreement will be obtained or is currently on file. We reviewed required monitoring for controlled substances including but not limited to quarterly follow-up visits, annual depression screening, and urine drug screens to which the patient is agreeable. A CHARISSE report has been or shortly will be reviewed. There are no signs of deviation or misuse.     The patient was  "counseled on goals and the need for weight reduction.   The body mass index (BMI), which is a ratio of weight to height, while not a perfect measurement of body fat, does correlate strongly with various metabolic and disease outcomes. A BMI in the 18.5 - 24.9 range is considered a normal or healthy weight. A BMI above 25 is considered overweight.  A BMI of 30 or above is considered obese.  A BMI of 35 and above with any chronic conditions (such as hypertension, hyperlipidemia or Coronary Artery Disease is considered morbidly obese. Even an initial 10% reduction in body weight can offer important health benefits and reduce long-term cardiovascular risk.     While there are an array of popular/fad diets being promoted, I generally recommend a diet of vegetables, fruits, whole grains, and lean protein sources including low-fat dairy products, poultry, and nuts with an emphasis toward minimizing intake of sweets, carbohydrates, and red meats. Obtaining 30-40 minutes of moderate to vigorous-intensity aerobic exercise on 4-5 days weekly will assist you in reaching your optimal weight, not to mention the cardiovascular benefit, although no amount of exercise will usually overcome the effects of a poor diet. (Moderate activity means you can talk, but not sing, while you are active.) While this may seem like an insurmountable task at first to some, adding 10 minutes of aerobic activity to your current amount of exercise gradually can help you reach the above goal. The National [employer] of [employer]'s \"Aim for a Healthy Weight\" website offers practical information on how you may improve your current diet and exercise habits. I have included the website's URL here for your reference: https://www.nhlbi.nih.gov/health/educational/lose_wt/index.htm. A nutritionist can help support you in developing an individualized diet plan. If you are interested, I would be happy to place a referral. Just let me know.   "             Counseling/Anticipatory Guidance:   Plan of care reviewed with patient at the conclusion of today's visit. Education was provided in regards to diagnosis, diet and exercise, prostate cancer screening discussed including benefit of early detection, potential need for follow-up, and harms associated with additional management. Patient agrees to screening.    Nutrition, physical activity, healthy weight,ways to reduce stress, adequate sleep, injury prevention, misuse of tobacco, alcohol and drugs, sexual behavior and STD's, dental health, mental health, and immunizations.    Plan of care reviewed with patient at the conclusion of today's visit. Education was provided regarding diagnosis, management and any prescribed or recommended OTC medications.  Patient verbalizes understanding of and agreement with management plan.    Return in about 3 months (around 10/20/2021) for Next scheduled follow up, Labs this visit.    * Please note that portions of this note were completed with a voice recognition program. Efforts were made to edit the dictation but occasionally words are transcribed.     SHAYAN Chopra

## 2021-08-14 DIAGNOSIS — G47.00 INSOMNIA, UNSPECIFIED TYPE: ICD-10-CM

## 2021-08-16 RX ORDER — ZOLPIDEM TARTRATE 10 MG/1
10 TABLET ORAL NIGHTLY PRN
Qty: 30 TABLET | Refills: 2 | Status: SHIPPED | OUTPATIENT
Start: 2021-08-16 | End: 2021-11-22

## 2021-08-16 NOTE — TELEPHONE ENCOUNTER
Rx Refill Note  Requested Prescriptions     Pending Prescriptions Disp Refills   • zolpidem (AMBIEN) 10 MG tablet [Pharmacy Med Name: Zolpidem Tartrate 10 MG Oral Tablet] 30 tablet 0     Sig: TAKE 1 TABLET BY MOUTH AT NIGHT AS NEEDED FOR SLEEP      Last office visit with prescribing clinician: 7/20/2021      Next office visit with prescribing clinician: 10/20/2021     LA: 07/12/21 #30 0R           Meghan Urena LPN  08/16/21, 08:53 EDT

## 2021-10-20 ENCOUNTER — OFFICE VISIT (OUTPATIENT)
Dept: INTERNAL MEDICINE | Facility: CLINIC | Age: 56
End: 2021-10-20

## 2021-10-20 ENCOUNTER — LAB (OUTPATIENT)
Dept: LAB | Facility: HOSPITAL | Age: 56
End: 2021-10-20

## 2021-10-20 VITALS
OXYGEN SATURATION: 97 % | DIASTOLIC BLOOD PRESSURE: 90 MMHG | WEIGHT: 236.6 LBS | BODY MASS INDEX: 33.87 KG/M2 | HEIGHT: 70 IN | SYSTOLIC BLOOD PRESSURE: 114 MMHG | TEMPERATURE: 97.8 F | HEART RATE: 76 BPM

## 2021-10-20 DIAGNOSIS — R53.83 LOW ENERGY: ICD-10-CM

## 2021-10-20 DIAGNOSIS — F51.01 PRIMARY INSOMNIA: Primary | ICD-10-CM

## 2021-10-20 PROCEDURE — 99213 OFFICE O/P EST LOW 20 MIN: CPT | Performed by: NURSE PRACTITIONER

## 2021-10-20 PROCEDURE — 84402 ASSAY OF FREE TESTOSTERONE: CPT

## 2021-10-20 PROCEDURE — 84403 ASSAY OF TOTAL TESTOSTERONE: CPT

## 2021-10-20 RX ORDER — MULTIPLE VITAMINS W/ MINERALS TAB 9MG-400MCG
1 TAB ORAL DAILY
COMMUNITY

## 2021-10-20 RX ORDER — LEVOCETIRIZINE DIHYDROCHLORIDE 5 MG/1
5 TABLET, FILM COATED ORAL EVERY EVENING
COMMUNITY

## 2021-10-23 LAB
TESTOST FREE SERPL-MCNC: 11.3 PG/ML (ref 7.2–24)
TESTOST SERPL-MCNC: 333 NG/DL (ref 264–916)

## 2021-10-25 RX ORDER — DOXAZOSIN 2 MG/1
TABLET ORAL
Qty: 90 TABLET | Refills: 0 | Status: SHIPPED | OUTPATIENT
Start: 2021-10-25 | End: 2022-01-24

## 2021-11-22 DIAGNOSIS — G47.00 INSOMNIA, UNSPECIFIED TYPE: ICD-10-CM

## 2021-11-22 RX ORDER — ZOLPIDEM TARTRATE 10 MG/1
TABLET ORAL
Qty: 30 TABLET | Refills: 2 | Status: SHIPPED | OUTPATIENT
Start: 2021-11-22 | End: 2022-02-21

## 2021-11-22 NOTE — TELEPHONE ENCOUNTER
Rx Refill Note  Requested Prescriptions     Pending Prescriptions Disp Refills   • zolpidem (AMBIEN) 10 MG tablet [Pharmacy Med Name: Zolpidem Tartrate 10 MG Oral Tablet] 15 tablet 0     Sig: TAKE 1 TABLET BY MOUTH AT BEDTIME AS NEEDED FOR SLEEP      Last office visit with prescribing clinician: 10/20/2021      Next office visit with prescribing clinician: 1/21/2022     LA: 08/16/21 #30 2R             Meghan Urena LPN  11/22/21, 11:06 EST

## 2022-01-24 RX ORDER — DOXAZOSIN 2 MG/1
TABLET ORAL
Qty: 90 TABLET | Refills: 0 | Status: SHIPPED | OUTPATIENT
Start: 2022-01-24 | End: 2022-04-27

## 2022-02-09 ENCOUNTER — OFFICE VISIT (OUTPATIENT)
Dept: INTERNAL MEDICINE | Facility: CLINIC | Age: 57
End: 2022-02-09

## 2022-02-09 VITALS
HEART RATE: 72 BPM | DIASTOLIC BLOOD PRESSURE: 82 MMHG | TEMPERATURE: 98.4 F | HEIGHT: 70 IN | WEIGHT: 233 LBS | BODY MASS INDEX: 33.36 KG/M2 | SYSTOLIC BLOOD PRESSURE: 122 MMHG

## 2022-02-09 DIAGNOSIS — F51.01 PRIMARY INSOMNIA: Primary | ICD-10-CM

## 2022-02-09 DIAGNOSIS — E66.09 CLASS 1 OBESITY DUE TO EXCESS CALORIES WITHOUT SERIOUS COMORBIDITY WITH BODY MASS INDEX (BMI) OF 33.0 TO 33.9 IN ADULT: ICD-10-CM

## 2022-02-09 PROCEDURE — 99214 OFFICE O/P EST MOD 30 MIN: CPT | Performed by: NURSE PRACTITIONER

## 2022-02-09 NOTE — PATIENT INSTRUCTIONS
This patient is on a controlled substance which improves symptoms/quality of life and is aware of the risks, benefits and possible side-effects current treatment. The patient denies any medication side-effects at this time. A controlled substance agreement will be obtained or is currently on file. We reviewed required monitoring for controlled substances including but not limited to quarterly follow-up visits, annual depression screening, and urine drug screens to which the patient is agreeable. A CHARISSE report has been or shortly will be reviewed. There are no signs of deviation or misuse.     The patient was counseled on goals and the need for weight reduction.   The body mass index (BMI), which is a ratio of weight to height, while not a perfect measurement of body fat, does correlate strongly with various metabolic and disease outcomes. A BMI in the 18.5 - 24.9 range is considered a normal or healthy weight. A BMI above 25 is considered overweight.  A BMI of 30 or above is considered obese.  A BMI of 35 and above with any chronic conditions (such as hypertension, hyperlipidemia or Coronary Artery Disease is considered morbidly obese. Even an initial 10% reduction in body weight can offer important health benefits and reduce long-term cardiovascular risk.     While there are an array of popular/fad diets being promoted, I generally recommend a diet of vegetables, fruits, whole grains, and lean protein sources including low-fat dairy products, poultry, and nuts with an emphasis toward minimizing intake of sweets, carbohydrates, and red meats. Obtaining 30-40 minutes of moderate to vigorous-intensity aerobic exercise on 4-5 days weekly will assist you in reaching your optimal weight, not to mention the cardiovascular benefit, although no amount of exercise will usually overcome the effects of a poor diet. (Moderate activity means you can talk, but not sing, while you are active.) While this may seem like an  "insurmountable task at first to some, adding 10 minutes of aerobic activity to your current amount of exercise gradually can help you reach the above goal. The National [employer] of [employer]'s \"Aim for a Healthy Weight\" website offers practical information on how you may improve your current diet and exercise habits. I have included the website's URL here for your reference: https://www.nhlbi.nih.gov/health/educational/lose_wt/index.htm. A nutritionist can help support you in developing an individualized diet plan. If you are interested, I would be happy to place a referral. Just let me know.             "

## 2022-02-09 NOTE — PROGRESS NOTES
"Braydon Eugene  1965  [id number]  Patient Care Team:  Cele Dos Santos APRN as PCP - General (Internal Medicine)    Braydon Eugene is a pleasant 57 y.o. male who presents for evaluation of Hyperlipidemia (follow up)    Chief Complaint   Patient presents with   • Hyperlipidemia     follow up       HPI:   Braydon is here for routine 3 mo follow for ambien compliance.  He has a pertinent medical history of BPH, hyperlipidemia, and insomnia.      Lower energy level this year, testosterone was 333.  Added B12 since last visit, initially seemed.  No back issues for a couple of months after 12-14 months.  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 No pertinent surgical history.  Family History   Problem Relation Age of Onset   • Cancer Mother         lung and ovarian      Social History     Tobacco Use   Smoking Status Never Smoker   Smokeless Tobacco Current User   • Types: Snuff     Allergies   Allergen Reactions   • Tetanus Toxoids Hives       Current Outpatient Medications:   •  atorvastatin (LIPITOR) 20 MG tablet, Take 1 tablet by mouth Daily., Disp: 90 tablet, Rfl: 11  •  doxazosin (CARDURA) 2 MG tablet, Take 1 tablet by mouth once daily, Disp: 90 tablet, Rfl: 0  •  levocetirizine (Xyzal) 5 MG tablet, Take 5 mg by mouth Every Evening., Disp: , Rfl:   •  multivitamin with minerals (MENS 50+ MULTI VITAMIN/MIN PO), Take 1 tablet by mouth Daily., Disp: , Rfl:   •  zolpidem (AMBIEN) 10 MG tablet, TAKE 1 TABLET BY MOUTH AT BEDTIME AS NEEDED FOR SLEEP, Disp: 30 tablet, Rfl: 2    Review of Systems  /82 (BP Location: Left arm, Patient Position: Sitting, Cuff Size: Adult)   Pulse 72   Temp 98.4 °F (36.9 °C) (Infrared)   Ht 177.8 cm (70\")   Wt 106 kg (233 lb)   BMI 33.43 kg/m²     Physical Exam  Vitals reviewed.   Constitutional:       Appearance: He is well-developed.   Pulmonary:      Effort: Pulmonary effort is " normal.   Neurological:      Mental Status: He is alert.   Psychiatric:         Mood and Affect: Mood normal.         Behavior: Behavior normal.         Thought Content: Thought content normal.         Judgment: Judgment normal.         Procedures    PHQ-9 Total Score:      Assessment/Plan:  Diagnoses and all orders for this visit:    1. Primary insomnia (Primary)  Comments:  continue ambien and f/u 3 mo    2. Class 1 obesity due to excess calories without serious comorbidity with body mass index (BMI) of 33.0 to 33.9 in adult  Comments:  encourage modest weight loss with increased physical acitivity and healthy diet.       Patient Instructions   This patient is on a controlled substance which improves symptoms/quality of life and is aware of the risks, benefits and possible side-effects current treatment. The patient denies any medication side-effects at this time. A controlled substance agreement will be obtained or is currently on file. We reviewed required monitoring for controlled substances including but not limited to quarterly follow-up visits, annual depression screening, and urine drug screens to which the patient is agreeable. A CHARISSE report has been or shortly will be reviewed. There are no signs of deviation or misuse.     The patient was counseled on goals and the need for weight reduction.   The body mass index (BMI), which is a ratio of weight to height, while not a perfect measurement of body fat, does correlate strongly with various metabolic and disease outcomes. A BMI in the 18.5 - 24.9 range is considered a normal or healthy weight. A BMI above 25 is considered overweight.  A BMI of 30 or above is considered obese.  A BMI of 35 and above with any chronic conditions (such as hypertension, hyperlipidemia or Coronary Artery Disease is considered morbidly obese. Even an initial 10% reduction in body weight can offer important health benefits and reduce long-term cardiovascular risk.     While there  "are an array of popular/fad diets being promoted, I generally recommend a diet of vegetables, fruits, whole grains, and lean protein sources including low-fat dairy products, poultry, and nuts with an emphasis toward minimizing intake of sweets, carbohydrates, and red meats. Obtaining 30-40 minutes of moderate to vigorous-intensity aerobic exercise on 4-5 days weekly will assist you in reaching your optimal weight, not to mention the cardiovascular benefit, although no amount of exercise will usually overcome the effects of a poor diet. (Moderate activity means you can talk, but not sing, while you are active.) While this may seem like an insurmountable task at first to some, adding 10 minutes of aerobic activity to your current amount of exercise gradually can help you reach the above goal. The [employer]'s \"Aim for a Healthy Weight\" website offers practical information on how you may improve your current diet and exercise habits. I have included the website's URL here for your reference: https://www.nhlbi.nih.gov/health/educational/lose_wt/index.htm. A nutritionist can help support you in developing an individualized diet plan. If you are interested, I would be happy to place a referral. Just let me know.               Plan of care reviewed with patient at the conclusion of today's visit. Education was provided regarding diagnosis, management and any prescribed or recommended OTC medications.  Patient verbalizes understanding of and agreement with management plan.    Return in about 3 months (around 5/9/2022) for Next scheduled follow up.    Dictated Utilizing Dragon Dictation.    SHAYAN Chopra          "

## 2022-02-21 DIAGNOSIS — G47.00 INSOMNIA, UNSPECIFIED TYPE: ICD-10-CM

## 2022-02-21 RX ORDER — ZOLPIDEM TARTRATE 10 MG/1
TABLET ORAL
Qty: 30 TABLET | Refills: 2 | Status: SHIPPED | OUTPATIENT
Start: 2022-02-21 | End: 2022-05-31

## 2022-02-21 NOTE — TELEPHONE ENCOUNTER
Rx Refill Note  Requested Prescriptions     Pending Prescriptions Disp Refills   • zolpidem (AMBIEN) 10 MG tablet [Pharmacy Med Name: Zolpidem Tartrate 10 MG Oral Tablet] 30 tablet 0     Sig: TAKE 1 TABLET BY MOUTH AT BEDTIME AS NEEDED FOR SLEEP      Last office visit with prescribing clinician: 2/9/2022      Next office visit with prescribing clinician: 5/6/2022     LA: 11/22/21 #30 2R             Meghan Urena LPN  02/21/22, 09:50 EST

## 2022-02-22 ENCOUNTER — TELEPHONE (OUTPATIENT)
Dept: INTERNAL MEDICINE | Facility: CLINIC | Age: 57
End: 2022-02-22

## 2022-02-22 DIAGNOSIS — G47.00 INSOMNIA, UNSPECIFIED TYPE: ICD-10-CM

## 2022-02-22 RX ORDER — ZOLPIDEM TARTRATE 10 MG/1
TABLET ORAL
Qty: 30 TABLET | Refills: 0 | OUTPATIENT
Start: 2022-02-22

## 2022-02-22 NOTE — TELEPHONE ENCOUNTER
Caller: Betzaida Eugene    Relationship to patient: Emergency Contact    Best call back number: [phone]    Patient is needing: BETZAIDA STATED THAT PHARMACY HAS NOT RECEIVED zolpidem (AMBIEN) 10 MG tablet MEDICATION FOR PATIENT AND IS GOING OUT OF TOWN [facility] FOR WORK AND WOULD NEED THIS ASAP PLEASE    PLEASE ADVISE

## 2022-02-22 NOTE — TELEPHONE ENCOUNTER
Called pharmacy. Insurance is only wanting to cover #15 per month.     Good RX coupon given for $15-20.     BIN: [id number]  PCN: [id number]  Group: [id number]  ID: [id number]    Betzaida notified and verbalized understanding

## 2022-04-27 RX ORDER — DOXAZOSIN 2 MG/1
TABLET ORAL
Qty: 90 TABLET | Refills: 0 | Status: SHIPPED | OUTPATIENT
Start: 2022-04-27 | End: 2022-07-29

## 2022-04-27 NOTE — TELEPHONE ENCOUNTER
Rx Refill Note  Requested Prescriptions     Pending Prescriptions Disp Refills   • doxazosin (CARDURA) 2 MG tablet [Pharmacy Med Name: Doxazosin Mesylate 2 MG Oral Tablet] 90 tablet 0     Sig: Take 1 tablet by mouth once daily      Last office visit with prescribing clinician: 2/9/2022      Next office visit with prescribing clinician: 5/6/2022            Meghan Urena LPN  04/27/22, 10:33 EDT

## 2022-05-04 RX ORDER — ATORVASTATIN CALCIUM 20 MG/1
TABLET, FILM COATED ORAL
Qty: 30 TABLET | Refills: 0 | Status: SHIPPED | OUTPATIENT
Start: 2022-05-04 | End: 2022-06-06

## 2022-05-06 ENCOUNTER — OFFICE VISIT (OUTPATIENT)
Dept: INTERNAL MEDICINE | Facility: CLINIC | Age: 57
End: 2022-05-06

## 2022-05-06 VITALS
SYSTOLIC BLOOD PRESSURE: 110 MMHG | BODY MASS INDEX: 32.64 KG/M2 | HEIGHT: 70 IN | HEART RATE: 68 BPM | DIASTOLIC BLOOD PRESSURE: 78 MMHG | TEMPERATURE: 98 F | WEIGHT: 228 LBS

## 2022-05-06 DIAGNOSIS — L30.9 DERMATITIS: ICD-10-CM

## 2022-05-06 DIAGNOSIS — F51.01 PRIMARY INSOMNIA: Primary | ICD-10-CM

## 2022-05-06 DIAGNOSIS — R42 VERTIGO: ICD-10-CM

## 2022-05-06 PROCEDURE — 99214 OFFICE O/P EST MOD 30 MIN: CPT | Performed by: NURSE PRACTITIONER

## 2022-05-06 RX ORDER — TRIAMCINOLONE ACETONIDE 5 MG/G
1 OINTMENT TOPICAL 2 TIMES DAILY
Qty: 15 G | Refills: 0 | Status: SHIPPED | OUTPATIENT
Start: 2022-05-06 | End: 2022-11-17

## 2022-05-06 NOTE — PROGRESS NOTES
Braydon Eugene  1965  [id number]  Patient Care Team:  Cele Dos Santos APRN as PCP - General (Internal Medicine)    Braydon Eugene is a pleasant 57 y.o. male who presents for evaluation of Hyperlipidemia    Chief Complaint   Patient presents with   • Hyperlipidemia       HPI:   Braydon is here for routine 3 mo follow for ambien compliance.  He has a pertinent medical history of BPH, hyperlipidemia, and insomnia.     He has developed a rash on posterior neck over the last couple of months. Antifungal cream worsened the rash, triamcinolone 0.1% helped some.    Acute episode Mon am of dizziness on waking.  Did not work secondary to sx.  Improved without tx and 85% better by Tuesday.  Has experienced some allergy sx.   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 No pertinent surgical history.  Family History   Problem Relation Age of Onset   • Cancer Mother         lung and ovarian      Social History     Tobacco Use   Smoking Status Never Smoker   Smokeless Tobacco Current User   • Types: Snuff     Allergies   Allergen Reactions   • Tetanus Toxoids Hives       Current Outpatient Medications:   •  atorvastatin (LIPITOR) 20 MG tablet, Take 1 tablet by mouth once daily, Disp: 30 tablet, Rfl: 0  •  doxazosin (CARDURA) 2 MG tablet, Take 1 tablet by mouth once daily, Disp: 90 tablet, Rfl: 0  •  levocetirizine (XYZAL) 5 MG tablet, Take 5 mg by mouth Every Evening., Disp: , Rfl:   •  multivitamin with minerals tablet tablet, Take 1 tablet by mouth Daily., Disp: , Rfl:   •  zolpidem (AMBIEN) 10 MG tablet, TAKE 1 TABLET BY MOUTH AT BEDTIME AS NEEDED FOR SLEEP, Disp: 30 tablet, Rfl: 2  •  triamcinolone (KENALOG) 0.5 % ointment, Apply 1 application topically to the appropriate area as directed 2 (Two) Times a Day., Disp: 15 g, Rfl: 0    Review of Systems  Review of systems was completed, and pertinent findings are noted in the  "HPI.  /78 (BP Location: Left arm, Patient Position: Sitting, Cuff Size: Large Adult)   Pulse 68   Temp 98 °F (36.7 °C) (Temporal)   Ht 177.8 cm (70\")   Wt 103 kg (228 lb)   BMI 32.71 kg/m²     Physical Exam  Constitutional:       Appearance: He is well-developed.   HENT:      Head: Normocephalic and atraumatic.      Comments: *wearing mask     Right Ear: Tympanic membrane and ear canal normal.      Left Ear: Tympanic membrane and ear canal normal.   Eyes:      Extraocular Movements: Extraocular movements intact.      Conjunctiva/sclera: Conjunctivae normal.      Pupils: Pupils are equal, round, and reactive to light.   Cardiovascular:      Rate and Rhythm: Normal rate and regular rhythm.      Pulses: Normal pulses.      Heart sounds: Normal heart sounds.   Pulmonary:      Effort: Pulmonary effort is normal.      Breath sounds: Normal breath sounds.   Musculoskeletal:         General: Normal range of motion.      Cervical back: Normal range of motion and neck supple.   Skin:     General: Skin is warm and dry.   Neurological:      Mental Status: He is alert and oriented to person, place, and time.   Psychiatric:         Mood and Affect: Mood normal.         Behavior: Behavior normal.         Thought Content: Thought content normal.         Judgment: Judgment normal.         Procedures    PHQ-9 Total Score:      Assessment/Plan:  Diagnoses and all orders for this visit:    1. Primary insomnia (Primary)  Comments:  continue ambien HS    2. Vertigo  Comments:  significantly improved, call if recurrent sx, see inst below    3. Dermatitis  Comments:  traimcinolone 0.5% BID x 10 days    Other orders  -     triamcinolone (KENALOG) 0.5 % ointment; Apply 1 application topically to the appropriate area as directed 2 (Two) Times a Day.  Dispense: 15 g; Refill: 0       There are no Patient Instructions on file for this visit.  Plan of care reviewed with patient at the conclusion of today's visit. Education was provided " regarding diagnosis, management and any prescribed or recommended OTC medications.  Patient verbalizes understanding of and agreement with management plan.    Return in about 3 months (around 8/6/2022) for Annual physical, Labs next visit.    Dictated Utilizing Dragon Dictation.    SHAYAN Chopra

## 2022-05-10 ENCOUNTER — TELEPHONE (OUTPATIENT)
Dept: INTERNAL MEDICINE | Facility: CLINIC | Age: 57
End: 2022-05-10

## 2022-05-10 DIAGNOSIS — R21 RASH OF NECK: Primary | ICD-10-CM

## 2022-05-10 NOTE — TELEPHONE ENCOUNTER
This came in patient's spouse's chart:    My  Braydon Eugene was seen by you the other day for the back of his neck.  The medication you prescribed is not working and his neck has gotten worse.  A picture is attached.  Can you refer him to a dermatologist?    Thank you!!

## 2022-05-31 ENCOUNTER — OFFICE VISIT (OUTPATIENT)
Dept: INTERNAL MEDICINE | Facility: CLINIC | Age: 57
End: 2022-05-31

## 2022-05-31 VITALS
TEMPERATURE: 97.8 F | SYSTOLIC BLOOD PRESSURE: 130 MMHG | BODY MASS INDEX: 32.93 KG/M2 | HEIGHT: 70 IN | DIASTOLIC BLOOD PRESSURE: 89 MMHG | HEART RATE: 73 BPM | OXYGEN SATURATION: 99 % | WEIGHT: 230 LBS

## 2022-05-31 DIAGNOSIS — R68.84 JAW PAIN: Primary | ICD-10-CM

## 2022-05-31 DIAGNOSIS — G47.00 INSOMNIA, UNSPECIFIED TYPE: ICD-10-CM

## 2022-05-31 PROCEDURE — 99214 OFFICE O/P EST MOD 30 MIN: CPT | Performed by: PHYSICIAN ASSISTANT

## 2022-05-31 RX ORDER — ZOLPIDEM TARTRATE 10 MG/1
TABLET ORAL
Qty: 30 TABLET | Refills: 2 | Status: SHIPPED | OUTPATIENT
Start: 2022-05-31 | End: 2022-05-31 | Stop reason: SDUPTHER

## 2022-05-31 RX ORDER — TERBINAFINE HYDROCHLORIDE 250 MG/1
250 TABLET ORAL DAILY
COMMUNITY
Start: 2022-05-23 | End: 2022-08-18

## 2022-05-31 RX ORDER — ZOLPIDEM TARTRATE 10 MG/1
10 TABLET ORAL NIGHTLY
Qty: 30 TABLET | Refills: 2 | Status: SHIPPED | OUTPATIENT
Start: 2022-05-31 | End: 2022-08-18 | Stop reason: SDUPTHER

## 2022-05-31 RX ORDER — CYCLOBENZAPRINE HCL 5 MG
5 TABLET ORAL NIGHTLY PRN
Qty: 20 TABLET | Refills: 0 | Status: SHIPPED | OUTPATIENT
Start: 2022-05-31 | End: 2022-08-18

## 2022-06-06 ENCOUNTER — OFFICE VISIT (OUTPATIENT)
Dept: INTERNAL MEDICINE | Facility: CLINIC | Age: 57
End: 2022-06-06

## 2022-06-06 VITALS
DIASTOLIC BLOOD PRESSURE: 86 MMHG | SYSTOLIC BLOOD PRESSURE: 118 MMHG | HEIGHT: 70 IN | HEART RATE: 80 BPM | BODY MASS INDEX: 32.07 KG/M2 | WEIGHT: 224 LBS | TEMPERATURE: 99.3 F

## 2022-06-06 DIAGNOSIS — R68.84 JAW PAIN: Primary | ICD-10-CM

## 2022-06-06 DIAGNOSIS — Z23 NEED FOR TDAP VACCINATION: ICD-10-CM

## 2022-06-06 PROCEDURE — 90715 TDAP VACCINE 7 YRS/> IM: CPT | Performed by: STUDENT IN AN ORGANIZED HEALTH CARE EDUCATION/TRAINING PROGRAM

## 2022-06-06 PROCEDURE — 99213 OFFICE O/P EST LOW 20 MIN: CPT | Performed by: STUDENT IN AN ORGANIZED HEALTH CARE EDUCATION/TRAINING PROGRAM

## 2022-06-06 PROCEDURE — 90471 IMMUNIZATION ADMIN: CPT | Performed by: STUDENT IN AN ORGANIZED HEALTH CARE EDUCATION/TRAINING PROGRAM

## 2022-06-06 RX ORDER — ATORVASTATIN CALCIUM 20 MG/1
TABLET, FILM COATED ORAL
Qty: 30 TABLET | Refills: 5 | Status: SHIPPED | OUTPATIENT
Start: 2022-06-06 | End: 2022-08-18 | Stop reason: SDUPTHER

## 2022-06-06 NOTE — PROGRESS NOTES
"Chief Complaint  Braydon Eugene is a 57 y.o. male presenting for Pain (Left jaw   wants tetanus vaccine ).     Patient has a past medical history of hyperlipidemia, BPH, insomnia, history of alcohol use and obesity.    History of Present Illness  Patient is here requesting tetanus vaccine.    He was out fishing about 3 weeks ago, he got a fishing hook stuck in his finger, and had it removed.  This was a nice clean and new fishhook.  He did not seek medical care to have this removed.  About 1 week later he started noticing left jaw pain.  He thought maybe was a tooth, went to see his dentist that attributed this to TMJ, gave him a bite guard.  The Gustavo Jefferson was rubbing on his tongue, so he stopped using it.  He is not having any involuntary cramping of his muscles, but was concerned since he did not get his tetanus vaccine.  Of note tetanus toxoids is listed as an allergy, patient states he got a tetanus shot in his childhood and broke out with hives afterwards and was told that he had an allergy to it.  However he also was evaluated about 10 years ago and had multiple tetanus injections without reacting to it.    The following portions of the patient's history were reviewed and updated as appropriate: allergies, current medications, past family history, past medical history, past social history, past surgical history and problem list.    Objective  /86 (BP Location: Left arm, Patient Position: Sitting, Cuff Size: Adult)   Pulse 80   Temp 99.3 °F (37.4 °C) (Temporal)   Ht 177 cm (69.69\")   Wt 102 kg (224 lb)   BMI 32.43 kg/m²     Physical Exam  Vitals reviewed.   Constitutional:       Appearance: Normal appearance.   HENT:      Head: Normocephalic and atraumatic.      Right Ear: Tympanic membrane, ear canal and external ear normal. There is no impacted cerumen.      Left Ear: Tympanic membrane, ear canal and external ear normal. There is no impacted cerumen.      Nose: Nose normal. No congestion.      " Mouth/Throat:      Mouth: Mucous membranes are moist.      Comments: Mildly tender to palpation around left TM joint.  Nontender around masseter and temporal muscles.  No involuntary spasm noted or triggered.  Eyes:      Extraocular Movements: Extraocular movements intact.      Conjunctiva/sclera: Conjunctivae normal.   Cardiovascular:      Rate and Rhythm: Normal rate and regular rhythm.      Heart sounds: Normal heart sounds. No murmur heard.  Pulmonary:      Effort: Pulmonary effort is normal.      Breath sounds: Normal breath sounds.   Musculoskeletal:      Cervical back: Neck supple.      Right lower leg: No edema.      Left lower leg: No edema.   Skin:     General: Skin is warm and dry.   Neurological:      Mental Status: He is alert and oriented to person, place, and time. Mental status is at baseline.   Psychiatric:         Behavior: Behavior normal.         Thought Content: Thought content normal.         Assessment/Plan   1. Jaw pain  Likely TMJ as cause of his symptoms.  No concern for tetanus.  Patient reference added to after visit summary regarding TMJ.    2. Need for Tdap vaccination  Administered today without reaction.  - Tdap Vaccine Greater Than or Equal To 8yo IM    From hospital chart review, charting face-to-face with patient 26 minutes    Return if symptoms worsen or fail to improve, for Next scheduled follow up.    Future Appointments       Provider Department Center    8/8/2022 8:00 AM Mady Lopez APRN [facility] INTERNAL MEDICINE [facility]            Thomas Baltazar MD  Family Medicine  06/06/2022

## 2022-07-29 RX ORDER — DOXAZOSIN 2 MG/1
TABLET ORAL
Qty: 90 TABLET | Refills: 0 | Status: SHIPPED | OUTPATIENT
Start: 2022-07-29 | End: 2022-08-18 | Stop reason: SDUPTHER

## 2022-07-29 NOTE — TELEPHONE ENCOUNTER
Rx Refill Note  Requested Prescriptions     Pending Prescriptions Disp Refills   • doxazosin (CARDURA) 2 MG tablet [Pharmacy Med Name: Doxazosin Mesylate 2 MG Oral Tablet] 90 tablet 0     Sig: Take 1 tablet by mouth once daily      Last office visit with prescribing clinician: Visit date not found      Next office visit with prescribing clinician: Visit date not found            Nataliia Malloy LPN  07/29/22, 14:05 EDT

## 2022-08-18 ENCOUNTER — OFFICE VISIT (OUTPATIENT)
Dept: INTERNAL MEDICINE | Facility: CLINIC | Age: 57
End: 2022-08-18

## 2022-08-18 ENCOUNTER — LAB (OUTPATIENT)
Dept: LAB | Facility: HOSPITAL | Age: 57
End: 2022-08-18

## 2022-08-18 VITALS
HEART RATE: 72 BPM | DIASTOLIC BLOOD PRESSURE: 86 MMHG | HEIGHT: 72 IN | BODY MASS INDEX: 30.83 KG/M2 | SYSTOLIC BLOOD PRESSURE: 114 MMHG | OXYGEN SATURATION: 99 % | WEIGHT: 227.6 LBS | TEMPERATURE: 97.8 F

## 2022-08-18 DIAGNOSIS — Z51.81 THERAPEUTIC DRUG MONITORING: ICD-10-CM

## 2022-08-18 DIAGNOSIS — Z13.0 SCREENING FOR DEFICIENCY ANEMIA: ICD-10-CM

## 2022-08-18 DIAGNOSIS — Z00.00 WELLNESS EXAMINATION: Primary | ICD-10-CM

## 2022-08-18 DIAGNOSIS — Z11.59 NEED FOR HEPATITIS C SCREENING TEST: ICD-10-CM

## 2022-08-18 DIAGNOSIS — E66.09 CLASS 1 OBESITY DUE TO EXCESS CALORIES WITHOUT SERIOUS COMORBIDITY WITH BODY MASS INDEX (BMI) OF 31.0 TO 31.9 IN ADULT: ICD-10-CM

## 2022-08-18 DIAGNOSIS — G47.00 INSOMNIA, UNSPECIFIED TYPE: ICD-10-CM

## 2022-08-18 DIAGNOSIS — F51.01 PRIMARY INSOMNIA: ICD-10-CM

## 2022-08-18 DIAGNOSIS — Z13.29 THYROID DISORDER SCREENING: ICD-10-CM

## 2022-08-18 DIAGNOSIS — Z12.5 PROSTATE CANCER SCREENING: ICD-10-CM

## 2022-08-18 DIAGNOSIS — Z13.1 DIABETES MELLITUS SCREENING: ICD-10-CM

## 2022-08-18 DIAGNOSIS — E78.2 MIXED HYPERLIPIDEMIA: ICD-10-CM

## 2022-08-18 DIAGNOSIS — N40.0 BENIGN PROSTATIC HYPERPLASIA WITHOUT LOWER URINARY TRACT SYMPTOMS: ICD-10-CM

## 2022-08-18 LAB
ALBUMIN SERPL-MCNC: 5.1 G/DL (ref 3.5–5.2)
ALBUMIN/GLOB SERPL: 2.3 G/DL
ALP SERPL-CCNC: 70 U/L (ref 39–117)
ALT SERPL W P-5'-P-CCNC: 37 U/L (ref 1–41)
ANION GAP SERPL CALCULATED.3IONS-SCNC: 11 MMOL/L (ref 5–15)
AST SERPL-CCNC: 27 U/L (ref 1–40)
BASOPHILS # BLD AUTO: 0.05 10*3/MM3 (ref 0–0.2)
BASOPHILS NFR BLD AUTO: 0.8 % (ref 0–1.5)
BILIRUB SERPL-MCNC: 0.5 MG/DL (ref 0–1.2)
BUN SERPL-MCNC: 12 MG/DL (ref 6–20)
BUN/CREAT SERPL: 10.3 (ref 7–25)
CALCIUM SPEC-SCNC: 9.7 MG/DL (ref 8.6–10.5)
CHLORIDE SERPL-SCNC: 101 MMOL/L (ref 98–107)
CHOLEST SERPL-MCNC: 166 MG/DL (ref 0–200)
CO2 SERPL-SCNC: 27 MMOL/L (ref 22–29)
CREAT SERPL-MCNC: 1.17 MG/DL (ref 0.76–1.27)
DEPRECATED RDW RBC AUTO: 38.5 FL (ref 37–54)
EGFRCR SERPLBLD CKD-EPI 2021: 73.6 ML/MIN/1.73
EOSINOPHIL # BLD AUTO: 0.12 10*3/MM3 (ref 0–0.4)
EOSINOPHIL NFR BLD AUTO: 1.8 % (ref 0.3–6.2)
ERYTHROCYTE [DISTWIDTH] IN BLOOD BY AUTOMATED COUNT: 11.7 % (ref 12.3–15.4)
GLOBULIN UR ELPH-MCNC: 2.2 GM/DL
GLUCOSE SERPL-MCNC: 105 MG/DL (ref 65–99)
HBA1C MFR BLD: 6.2 % (ref 4.8–5.6)
HCT VFR BLD AUTO: 41.6 % (ref 37.5–51)
HCV AB SER DONR QL: NORMAL
HDLC SERPL-MCNC: 34 MG/DL (ref 40–60)
HGB BLD-MCNC: 14.5 G/DL (ref 13–17.7)
IMM GRANULOCYTES # BLD AUTO: 0.04 10*3/MM3 (ref 0–0.05)
IMM GRANULOCYTES NFR BLD AUTO: 0.6 % (ref 0–0.5)
LDLC SERPL CALC-MCNC: 104 MG/DL (ref 0–100)
LDLC/HDLC SERPL: 2.94 {RATIO}
LYMPHOCYTES # BLD AUTO: 1.71 10*3/MM3 (ref 0.7–3.1)
LYMPHOCYTES NFR BLD AUTO: 25.8 % (ref 19.6–45.3)
MCH RBC QN AUTO: 31.7 PG (ref 26.6–33)
MCHC RBC AUTO-ENTMCNC: 34.9 G/DL (ref 31.5–35.7)
MCV RBC AUTO: 90.8 FL (ref 79–97)
MONOCYTES # BLD AUTO: 0.44 10*3/MM3 (ref 0.1–0.9)
MONOCYTES NFR BLD AUTO: 6.6 % (ref 5–12)
NEUTROPHILS NFR BLD AUTO: 4.28 10*3/MM3 (ref 1.7–7)
NEUTROPHILS NFR BLD AUTO: 64.4 % (ref 42.7–76)
NRBC BLD AUTO-RTO: 0 /100 WBC (ref 0–0.2)
PLATELET # BLD AUTO: 244 10*3/MM3 (ref 140–450)
PMV BLD AUTO: 10 FL (ref 6–12)
POTASSIUM SERPL-SCNC: 4.4 MMOL/L (ref 3.5–5.2)
PROT SERPL-MCNC: 7.3 G/DL (ref 6–8.5)
PSA SERPL-MCNC: 0.57 NG/ML (ref 0–4)
RBC # BLD AUTO: 4.58 10*6/MM3 (ref 4.14–5.8)
SODIUM SERPL-SCNC: 139 MMOL/L (ref 136–145)
TRIGL SERPL-MCNC: 160 MG/DL (ref 0–150)
TSH SERPL DL<=0.05 MIU/L-ACNC: 2.81 UIU/ML (ref 0.27–4.2)
VLDLC SERPL-MCNC: 28 MG/DL (ref 5–40)
WBC NRBC COR # BLD: 6.64 10*3/MM3 (ref 3.4–10.8)

## 2022-08-18 PROCEDURE — 99396 PREV VISIT EST AGE 40-64: CPT | Performed by: NURSE PRACTITIONER

## 2022-08-18 PROCEDURE — 83036 HEMOGLOBIN GLYCOSYLATED A1C: CPT

## 2022-08-18 PROCEDURE — 86803 HEPATITIS C AB TEST: CPT

## 2022-08-18 PROCEDURE — 85025 COMPLETE CBC W/AUTO DIFF WBC: CPT

## 2022-08-18 PROCEDURE — 80307 DRUG TEST PRSMV CHEM ANLYZR: CPT

## 2022-08-18 PROCEDURE — G0483 DRUG TEST DEF 22+ CLASSES: HCPCS

## 2022-08-18 PROCEDURE — 80061 LIPID PANEL: CPT

## 2022-08-18 PROCEDURE — G0103 PSA SCREENING: HCPCS

## 2022-08-18 PROCEDURE — 84443 ASSAY THYROID STIM HORMONE: CPT

## 2022-08-18 PROCEDURE — 80053 COMPREHEN METABOLIC PANEL: CPT

## 2022-08-18 RX ORDER — ZOLPIDEM TARTRATE 10 MG/1
10 TABLET ORAL NIGHTLY
Qty: 30 TABLET | Refills: 2 | Status: SHIPPED | OUTPATIENT
Start: 2022-08-18 | End: 2022-11-17 | Stop reason: SDUPTHER

## 2022-08-18 RX ORDER — ATORVASTATIN CALCIUM 20 MG/1
20 TABLET, FILM COATED ORAL DAILY
Qty: 90 TABLET | Refills: 2 | Status: SHIPPED | OUTPATIENT
Start: 2022-08-18

## 2022-08-18 RX ORDER — DOXAZOSIN 2 MG/1
2 TABLET ORAL DAILY
Qty: 90 TABLET | Refills: 2 | Status: SHIPPED | OUTPATIENT
Start: 2022-08-18

## 2022-08-18 NOTE — PROGRESS NOTES
Male Physical Exam     Patient Name: Braydon Eugene  : 1965   MRN: [id number]   Care Team: Patient Care Team:  Mady Lopez APRN as PCP - General (Nurse Practitioner)    Chief Complaint:    Chief Complaint   Patient presents with   • Annual Exam   • Hyperlipidemia       History of Present Illness: Braydon Eugene is a 57 y.o. male who is here today for a physical examination.      He has pertinent medical history significant for hyperlipidemia, insomnia, BPH without lower urinary tract symptoms, insomnia, obesity and history of alcohol abuse.    States that overall he feels well and has no major health concerns to discuss.    Insomnia-reports his symptoms are well controlled on therapy of zolpidem 10 mg nightly.  States he is able to sleep a good 6 to 7 hours through with this therapy.    Hyperlipidemia-continuing to take atorvastatin 20 mg daily as prescribed.  Admits he does not follow a healthy diet and often times eating fast food and snack items.    He is not performing any routine exercise at this time but feels that he needs to as he is started having some low back issues.    BPH-reports symptoms well controlled on therapy of doxazosin 2 mg daily.    Noted that Cologuard was completed at  in the last 3 years.  Dates findings were normal.  He denies any changes in his bowel habits at this time.    He is current on dental and vision exams.    Subjective      Review of Systems:   Review of Systems   Respiratory: Negative for cough and shortness of breath.    Cardiovascular: Negative for chest pain, palpitations and leg swelling.   Gastrointestinal: Negative for abdominal pain, constipation, diarrhea and GERD.   Musculoskeletal: Positive for arthralgias (Some intermittent low back pain).   Psychiatric/Behavioral: Positive for sleep disturbance (Controlled on medication therapy of zolpidem 10 mg nightly).       Past Medical History:   Past Medical History:   Diagnosis Date   • Alcohol abuse  2003   • Positive TB test    • Stroke (HCC)        Past Surgical History: No past surgical history on file.    Family History:   Family History   Problem Relation Age of Onset   • Cancer Mother         lung and ovarian        Social History:   Social History     Socioeconomic History   • Marital status:    Tobacco Use   • Smoking status: Never Smoker   • Smokeless tobacco: Current User     Types: Snuff   Vaping Use   • Vaping Use: Never used   Substance and Sexual Activity   • Alcohol use: No     Comment: history of alcohol abuse, quit about 15 years ago    • Drug use: No   • Sexual activity: Defer       Tobacco History:   Social History     Tobacco Use   Smoking Status Never Smoker   Smokeless Tobacco Current User   • Types: Snuff       Medications:     Current Outpatient Medications:   •  atorvastatin (LIPITOR) 20 MG tablet, Take 1 tablet by mouth Daily., Disp: 90 tablet, Rfl: 2  •  doxazosin (CARDURA) 2 MG tablet, Take 1 tablet by mouth Daily., Disp: 90 tablet, Rfl: 2  •  levocetirizine (XYZAL) 5 MG tablet, Take 5 mg by mouth Every Evening., Disp: , Rfl:   •  multivitamin with minerals tablet tablet, Take 1 tablet by mouth Daily., Disp: , Rfl:   •  triamcinolone (KENALOG) 0.5 % ointment, Apply 1 application topically to the appropriate area as directed 2 (Two) Times a Day., Disp: 15 g, Rfl: 0  •  zolpidem (AMBIEN) 10 MG tablet, Take 1 tablet by mouth Every Night. for sleep, Disp: 30 tablet, Rfl: 2    Allergies:   Allergies   Allergen Reactions   • Tetanus Toxoids Hives       Immunizations:  Td/Tdap(Booster Q 10 yrs): Current  Flu (Yearly): Recommended    Colorectal Screening:     Last Completed Colonoscopy     This patient has no relevant Health Maintenance data.      Cologuard performed at [facility]/external location approximately 3 years ago with normal findings    CT for Smoker (Age 55-75, 30pk yr): N/A  Hep C ( [id number]): Ordered  PSA(Over age 50): Ordered  US Aorta (For male smokers, age  "65): N/A    Diet/Physical activity: Discussed and recommended      Depression: PHQ-2 Depression Screening  Little interest or pleasure in doing things?  0   Feeling down, depressed, or hopeless?  0   PHQ-2 Total Score  0      Intimate partner violence: (Screen on initial visit, older adult with injury or evidence of neglect):  • Violence can be a problem in many people's lives, so I now ask every patient about trauma or abuse they may have experienced in a relationship.  • Stress/Safety - Do you feel safe in your relationship?  • Afraid/Abused - Have you ever been in a relationship where you were threatened, hurt, or afraid?  • Friend/Family - Are your friends aware you have been hurt?  • Emergency Plan - Do you have a safe place to go and the resources you need in an emergency?    Osteoporosis:   • Men: history of low trauma fracture, androgen deprivation therapy for prostate cancer, hypogonadism, primary hyperparathyroidism, intestinal disorders.     Objective     Physical Exam:  Vital Signs:   Vitals:    08/18/22 0759   BP: 114/86   BP Location: Left arm   Patient Position: Sitting   Cuff Size: Adult   Pulse: 72   Temp: 97.8 °F (36.6 °C)   TempSrc: Infrared   SpO2: 99%   Weight: 103 kg (227 lb 9.6 oz)   Height: 181.6 cm (71.5\")   PainSc: 0-No pain     Body mass index is 31.3 kg/m².     Physical Exam  Vitals and nursing note reviewed.   Constitutional:       General: He is not in acute distress.  HENT:      Head: Normocephalic and atraumatic.      Right Ear: Tympanic membrane, ear canal and external ear normal.      Left Ear: Tympanic membrane, ear canal and external ear normal.      Mouth/Throat:      Mouth: Mucous membranes are moist.      Pharynx: Oropharynx is clear.   Eyes:      General: No scleral icterus.     Conjunctiva/sclera: Conjunctivae normal.   Neck:      Vascular: No carotid bruit.   Cardiovascular:      Rate and Rhythm: Normal rate and regular rhythm.      Pulses: Normal pulses.      Heart sounds: " No murmur heard.  Pulmonary:      Effort: Pulmonary effort is normal. No respiratory distress.      Breath sounds: No wheezing.   Abdominal:      General: Bowel sounds are normal. There is no distension.      Tenderness: There is no abdominal tenderness. There is no guarding.   Musculoskeletal:         General: Normal range of motion.      Cervical back: Neck supple. No tenderness.      Right lower leg: No edema.      Left lower leg: No edema.   Lymphadenopathy:      Cervical: No cervical adenopathy.   Skin:     Capillary Refill: Capillary refill takes less than 2 seconds.   Neurological:      General: No focal deficit present.      Mental Status: He is alert.      Gait: Gait normal.      Deep Tendon Reflexes: Reflexes normal.   Psychiatric:         Mood and Affect: Mood normal.         Thought Content: Thought content normal.         Judgment: Judgment normal.         Assessment / Plan      Assessment/Plan:   Problems Addressed This Visit    ICD-10-CM ICD-9-CM   1. Wellness examination  Z00.00 V70.0   2. Mixed hyperlipidemia  E78.2 272.2   3. Class 1 obesity due to excess calories without serious comorbidity with body mass index (BMI) of 31.0 to 31.9 in adult  E66.09 278.00    Z68.31 V85.31   4. Primary insomnia  F51.01 307.42   5. Benign prostatic hyperplasia without lower urinary tract symptoms  N40.0 600.00   6. Thyroid disorder screening  Z13.29 V77.0   7. Diabetes mellitus screening  Z13.1 V77.1   8. Need for hepatitis C screening test  Z11.59 V73.89   9. Therapeutic drug monitoring  Z51.81 V58.83   10. Prostate cancer screening  Z12.5 V76.44   11. Screening for deficiency anemia  Z13.0 V78.1   12. Insomnia, unspecified type  G47.00 780.52     Wellness exam  -Reviewed medical history, social history, medication therapies, need for screening labs and immunizations    -Recommended patient initiate Shingrix vaccine at the pharmacy at his discretion    -Screening labs ordered including CBC, CMP, lipids, TSH, PSA,  hep C and A1c    Insomnia  -Controlled on current therapy of zolpidem 10 mg nightly, continue  -Provided refill to pharmacy of zolpidem 10 mg nightly, #30 with 2 refills  -Follow-up in 3 months  -Compliance UDS ordered    BPH  -Continue doxazosin 2 mg daily  -PSA ordered    Seasonal allergies  -Continue OTC antihistamines as needed    Hyperlipidemia  -Swapna importance of healthy diet and routine exercise in the management of high cholesterol  -Continue atorvastatin 20 mg daily  -Lipid panel pending    BMI is >= 30 and <35. (Class 1 Obesity). The following options were offered after discussion;: weight loss educational material (shared in after visit summary), exercise counseling/recommendations and nutrition counseling/recommendations    Plan of care reviewed with patient at the conclusion of today's visit. Education was provided regarding diagnosis and management.  Patient verbalizes understanding of and agreement with management plan.      Follow Up:   Return in about 3 months (around 11/18/2022).        SHAYAN Ferris  [facility] [address]    Please note that portions of this note were completed with a voice recognition program.

## 2022-08-24 LAB — DRUGS UR: NORMAL

## 2022-08-31 ENCOUNTER — TELEPHONE (OUTPATIENT)
Dept: INTERNAL MEDICINE | Facility: CLINIC | Age: 57
End: 2022-08-31

## 2022-08-31 NOTE — TELEPHONE ENCOUNTER
Caller: Betzaida Eugene    Relationship: Emergency Contact    Best call back number: [phone]    What test was performed: LABS    When was the test performed: 3 WEEKS AGO    Where was the test performed: [facility]     Additional notes: PLEASE  MAIL RESULTS

## 2022-11-17 ENCOUNTER — OFFICE VISIT (OUTPATIENT)
Dept: INTERNAL MEDICINE | Facility: CLINIC | Age: 57
End: 2022-11-17

## 2022-11-17 VITALS
HEIGHT: 71 IN | WEIGHT: 231 LBS | SYSTOLIC BLOOD PRESSURE: 116 MMHG | HEART RATE: 68 BPM | DIASTOLIC BLOOD PRESSURE: 76 MMHG | TEMPERATURE: 98.2 F | BODY MASS INDEX: 32.34 KG/M2

## 2022-11-17 DIAGNOSIS — E78.2 MIXED HYPERLIPIDEMIA: ICD-10-CM

## 2022-11-17 DIAGNOSIS — G47.00 INSOMNIA, UNSPECIFIED TYPE: ICD-10-CM

## 2022-11-17 DIAGNOSIS — F51.01 PRIMARY INSOMNIA: Primary | ICD-10-CM

## 2022-11-17 PROCEDURE — 91312 COVID-19 (PFIZER) BIVALENT BOOSTER 12+YRS: CPT | Performed by: NURSE PRACTITIONER

## 2022-11-17 PROCEDURE — 99213 OFFICE O/P EST LOW 20 MIN: CPT | Performed by: NURSE PRACTITIONER

## 2022-11-17 PROCEDURE — 0124A COVID-19 (PFIZER) BIVALENT BOOSTER 12+YRS: CPT | Performed by: NURSE PRACTITIONER

## 2022-11-17 PROCEDURE — 90471 IMMUNIZATION ADMIN: CPT | Performed by: NURSE PRACTITIONER

## 2022-11-17 PROCEDURE — 90750 HZV VACC RECOMBINANT IM: CPT | Performed by: NURSE PRACTITIONER

## 2022-11-17 RX ORDER — ZOLPIDEM TARTRATE 10 MG/1
10 TABLET ORAL NIGHTLY
Qty: 30 TABLET | Refills: 2 | Status: SHIPPED | OUTPATIENT
Start: 2022-11-17 | End: 2023-03-07 | Stop reason: SDUPTHER

## 2022-11-17 NOTE — PROGRESS NOTES
Follow Up Office Visit      Patient Name: Braydon Eugene  : 1965   MRN: [id number]   Care Team: Patient Care Team:  Mady Lopez APRN as PCP - General (Nurse Practitioner)    Chief Complaint:    Chief Complaint   Patient presents with   • Hyperlipidemia       History of Present Illness: Braydon Eugene is a 57 y.o. male who is here today today for a 3 month follow-up on his chronic conditions.      Insomnia   he states that the Ambien is still working. The patient reports sleeping 6 or 7 hours a night, but it is not consistent throughout the night. He notes that hr falls asleep easily.     Elevated A1c  Hyperlipidemia  His recent labs were within normal limits, other thank his A1c is within the high risk area at 6.2 percent. The patient's triglycerides and LDL were slightly elevated. His LDL was 104 mg/dL.  The patient is compliant with his cholesterol medication.     He states his wife has been improving their diet.     The patient reports that his allergies were significantly worse in the summer and have improved within the last week. He notes that he takes allergy medication every night.     .     Subjective      Review of Systems:   Review of Systems   Constitutional: Negative for chills and fever.   Respiratory: Negative for cough and shortness of breath.    Cardiovascular: Negative for chest pain, palpitations and leg swelling.   Gastrointestinal: Negative for anal bleeding.   Allergic/Immunologic: Positive for environmental allergies.   Psychiatric/Behavioral: Positive for sleep disturbance (Controlled with medication therapy of zolpidem).       I have reviewed and the following portions of the patient's history were updated as appropriate: past family history, past medical history, past social history, past surgical history and problem list.    Medications:     Current Outpatient Medications:   •  atorvastatin (LIPITOR) 20 MG tablet, Take 1 tablet by mouth Daily., Disp: 90 tablet, Rfl:  "2  •  doxazosin (CARDURA) 2 MG tablet, Take 1 tablet by mouth Daily., Disp: 90 tablet, Rfl: 2  •  levocetirizine (XYZAL) 5 MG tablet, Take 5 mg by mouth Every Evening., Disp: , Rfl:   •  multivitamin with minerals tablet tablet, Take 1 tablet by mouth Daily., Disp: , Rfl:   •  zolpidem (AMBIEN) 10 MG tablet, Take 1 tablet by mouth Every Night. for sleep, Disp: 30 tablet, Rfl: 2    Allergies:   Allergies   Allergen Reactions   • Tetanus Toxoids Hives     Patient took a tetanus shot 2021 with no problems       Objective     Physical Exam:  Vital Signs:   Vitals:    11/17/22 0830   BP: 116/76   BP Location: Left arm   Patient Position: Sitting   Cuff Size: Large Adult   Pulse: 68   Temp: 98.2 °F (36.8 °C)   TempSrc: Temporal   Weight: 105 kg (231 lb)   Height: 181.6 cm (71.5\")   PainSc: 0-No pain     Body mass index is 31.77 kg/m².     Physical Exam  Vitals and nursing note reviewed.   Constitutional:       General: He is not in acute distress.  HENT:      Head: Normocephalic and atraumatic.      Right Ear: Tympanic membrane, ear canal and external ear normal.      Left Ear: Tympanic membrane, ear canal and external ear normal.      Mouth/Throat:      Mouth: Mucous membranes are moist.      Pharynx: Oropharynx is clear. No oropharyngeal exudate.   Eyes:      General: No scleral icterus.     Conjunctiva/sclera: Conjunctivae normal.   Cardiovascular:      Rate and Rhythm: Normal rate and regular rhythm.      Pulses: Normal pulses.   Pulmonary:      Effort: Pulmonary effort is normal. No respiratory distress.   Musculoskeletal:         General: Normal range of motion.      Cervical back: Neck supple. No tenderness.      Right lower leg: No edema.      Left lower leg: No edema.   Lymphadenopathy:      Cervical: No cervical adenopathy.   Neurological:      Mental Status: He is alert.   Psychiatric:         Mood and Affect: Mood normal.         Thought Content: Thought content normal.         Judgment: Judgment normal. "         Assessment / Plan      Assessment/Plan:   Problems Addressed This Visit    ICD-10-CM ICD-9-CM   1. Primary insomnia  F51.01 307.42   2. Insomnia, unspecified type  G47.00 780.52   3. Mixed hyperlipidemia  E78.2 272.2      Primary insomnia  -Symptoms well controlled with current dose of zolpidem  -Refilled zolpidem 10 mg nightly #30 with 2 refill    Hyperlipidemia  Elevated hemoglobin A1c  -Reviewed results of previous labs with patient.  Noted A1c of 6.2, LDL of 104, triglycerides of greater than 160  -Discussed importance of healthy diet, specifically lowering intake of red meat, processed foods, saturated fats.  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  We have discussed options of doing grilled chicken, side salad and/or baked chips instead of French fries.  -We will plan to recheck A1c at follow-up appointment in 3 months    Shingrix dose #1 administered  COVID Bivalent dose administered  -Encourage patient to use OTC acetaminophen and/or ibuprofen for symptom/side effect relief  -Plan for second dose of Shingrix at his follow-up appointment in 3 months    Plan of care reviewed with patient at the conclusion of today's visit. Education was provided regarding diagnosis and management.  Patient verbalizes understanding of and agreement with management plan.    The patient is doing well overall. Refills of his medication were sent to the patient's pharmacy. The patient received his 1st shingles vaccine and COVID-19 vaccine today in the office. He will follow up in 3 months and obtain blood work prior to the appointment.     Follow Up:   Return in about 3 months (around 2/17/2023).        SHAYAN Ferris  [facility] [address]    Please note that portions of this note were completed with a voice recognition program.      Answers for HPI/ROS submitted by the patient on 11/16/2022  What is the primary  reason for your visit?: Other  Please describe your symptoms.: Checking for medication  Have you had these symptoms before?: No  How long have you been having these symptoms?: Greater than 2 weeks    Transcribed from ambient dictation for SHAYAN Flynn by Katerin Alfredo.  11/17/22   11:03 EST    Patient or patient representative verbalized consent to the visit recording.  I have personally performed the services described in this document as transcribed by the above individual, and it is both accurate and complete.

## 2022-11-26 ENCOUNTER — TELEPHONE (OUTPATIENT)
Dept: INTERNAL MEDICINE | Facility: CLINIC | Age: 57
End: 2022-11-26

## 2022-11-26 ENCOUNTER — DOCUMENTATION (OUTPATIENT)
Dept: INTERNAL MEDICINE | Facility: CLINIC | Age: 57
End: 2022-11-26

## 2022-11-26 RX ORDER — BENZONATATE 100 MG/1
100 CAPSULE ORAL 3 TIMES DAILY PRN
Qty: 30 CAPSULE | Refills: 0 | Status: SHIPPED | OUTPATIENT
Start: 2022-11-26 | End: 2023-03-07

## 2022-11-26 RX ORDER — OSELTAMIVIR PHOSPHATE 75 MG/1
75 CAPSULE ORAL 2 TIMES DAILY
Qty: 10 CAPSULE | Refills: 0 | Status: SHIPPED | OUTPATIENT
Start: 2022-11-26 | End: 2022-12-13

## 2022-11-26 NOTE — TELEPHONE ENCOUNTER
Patient exposed to flu at [facility]. Complaining of sore throat, cough,and fever.  Tamiflu and tessalon perles sent to pharmacy.

## 2022-12-13 ENCOUNTER — OFFICE VISIT (OUTPATIENT)
Dept: INTERNAL MEDICINE | Facility: CLINIC | Age: 57
End: 2022-12-13

## 2022-12-13 VITALS
SYSTOLIC BLOOD PRESSURE: 120 MMHG | DIASTOLIC BLOOD PRESSURE: 86 MMHG | HEIGHT: 71 IN | WEIGHT: 233.8 LBS | HEART RATE: 76 BPM | TEMPERATURE: 98.4 F | BODY MASS INDEX: 32.73 KG/M2

## 2022-12-13 DIAGNOSIS — J98.8 RESPIRATORY INFECTION: Primary | ICD-10-CM

## 2022-12-13 DIAGNOSIS — Z12.11 SCREEN FOR COLON CANCER: ICD-10-CM

## 2022-12-13 PROCEDURE — 99213 OFFICE O/P EST LOW 20 MIN: CPT | Performed by: STUDENT IN AN ORGANIZED HEALTH CARE EDUCATION/TRAINING PROGRAM

## 2022-12-13 RX ORDER — AZITHROMYCIN 250 MG/1
TABLET, FILM COATED ORAL
Qty: 6 TABLET | Refills: 0 | Status: SHIPPED | OUTPATIENT
Start: 2022-12-13 | End: 2023-03-07

## 2022-12-13 NOTE — PROGRESS NOTES
"Chief Complaint  Braydon Eugene is a 57 y.o. male presenting for Cough (Runny nose , congestion , post nasal drip    X's 1 week ).     Patient has a past medical history of hyperlipidemia, BPH, insomnia, history of alcohol use and obesity.    History of Present Illness  Patient is here for respiratory infection.  He was diagnosed with influenza A on 11/26/2022, right after Thanksgiving where several family members were infected.  He was recovering last week and was traveling to [place] for fishing.  He was out in the cold rain, 40 degree temperatures.  Since that he has been experiencing new symptoms with cough, green-yellow phlegm, sometimes clear.  Also had chills and fever.  Some nasal congestion and sore throat, which resolved.  Patient feels possibly mildly improved over the last 2 days.  Also occasional wheezing, no shortness of breath.  He was home from work yesterday, but did not go today.    The following portions of the patient's history were reviewed and updated as appropriate: allergies, current medications, past family history, past medical history, past social history, past surgical history and problem list.    Objective  /86 (BP Location: Left arm, Patient Position: Sitting, Cuff Size: Adult)   Pulse 76   Temp 98.4 °F (36.9 °C) (Temporal)   Ht 181.6 cm (71.5\")   Wt 106 kg (233 lb 12.8 oz)   BMI 32.16 kg/m²     Physical Exam  Vitals reviewed.   Constitutional:       Appearance: Normal appearance.   HENT:      Head: Normocephalic and atraumatic.      Right Ear: Tympanic membrane, ear canal and external ear normal. There is no impacted cerumen.      Left Ear: Tympanic membrane, ear canal and external ear normal. There is no impacted cerumen.      Nose: No congestion.   Eyes:      Extraocular Movements: Extraocular movements intact.      Conjunctiva/sclera: Conjunctivae normal.   Cardiovascular:      Rate and Rhythm: Normal rate and regular rhythm.      Heart sounds: Normal heart " sounds. No murmur heard.  Pulmonary:      Effort: Pulmonary effort is normal.      Breath sounds: Normal breath sounds.   Musculoskeletal:      Cervical back: Neck supple.   Skin:     General: Skin is warm and dry.   Neurological:      Mental Status: He is alert and oriented to person, place, and time. Mental status is at baseline.   Psychiatric:         Behavior: Behavior normal.         Thought Content: Thought content normal.         Assessment/Plan   1. Respiratory infection  Likely viral infection.  Patient is improving, but have to keep in mind potential for secondary infection given recent influenza.  However he is well-appearing today, so I have recommended to hold off on antibiotics for few days, but if he gets worse I would go ahead and start it.  I would consider annual flu vaccine and also coming in for this years flu vaccine when he has recovered.  - azithromycin (Zithromax Z-Mark) 250 MG tablet; Take 2 tablets the first day, then 1 tablet daily for 4 days.  Dispense: 6 tablet; Refill: 0    2. Screen for colon cancer  Patient states he had Cologuard done about 2 to 3 years ago.  He is not sure if his insurance will cover colonoscopy, but he is amenable to repeat Cologuard.  - Cologuard - Stool, Per Rectum; Future      Return if symptoms worsen or fail to improve, for Next scheduled follow up.    Future Appointments       Provider Department Center    2/20/2023 8:00 AM Mady Lopez APRN [facility] INTERNAL MEDICINE [facility]            Thomas Baltazar MD  Family Medicine  12/13/2022

## 2022-12-28 RX ORDER — DOXYCYCLINE HYCLATE 100 MG
100 TABLET ORAL 2 TIMES DAILY
Qty: 14 TABLET | Refills: 0 | Status: SHIPPED | OUTPATIENT
Start: 2022-12-28 | End: 2023-03-07

## 2023-02-01 ENCOUNTER — TELEPHONE (OUTPATIENT)
Dept: INTERNAL MEDICINE | Facility: CLINIC | Age: 58
End: 2023-02-01
Payer: COMMERCIAL

## 2023-02-01 NOTE — TELEPHONE ENCOUNTER
Caller: Alex Eugene A    Relationship: Self    Best call back number: [phone]    What is the best time to reach you: ANY TIME    Who are you requesting to speak with (clinical staff, provider,  specific staff member): CLINICAL STAFF    Do you know the name of the person who called: ALEX    What was the call regarding: PATIENT RECEIVED A LETTER ASKING HIM TO CHOOSE A NEW PROVIDER GIVEN ADRIENNE'S OFFBOARDING. HE STATES THAT HE WOULD LIKE TO BE SEEN BY DR DAVILA.     PLEASE ADVISE PATIENT WITH NEXT STEPS OR ANY ADDITIONAL INFO THAT MAY BE NEEDED.    Do you require a callback: YES

## 2023-03-07 ENCOUNTER — LAB (OUTPATIENT)
Dept: LAB | Facility: HOSPITAL | Age: 58
End: 2023-03-07
Payer: COMMERCIAL

## 2023-03-07 ENCOUNTER — OFFICE VISIT (OUTPATIENT)
Dept: INTERNAL MEDICINE | Facility: CLINIC | Age: 58
End: 2023-03-07
Payer: COMMERCIAL

## 2023-03-07 VITALS
TEMPERATURE: 98 F | BODY MASS INDEX: 32.84 KG/M2 | SYSTOLIC BLOOD PRESSURE: 120 MMHG | HEIGHT: 71 IN | HEART RATE: 60 BPM | WEIGHT: 234.6 LBS | DIASTOLIC BLOOD PRESSURE: 86 MMHG

## 2023-03-07 DIAGNOSIS — R73.03 PREDIABETES: Chronic | ICD-10-CM

## 2023-03-07 DIAGNOSIS — E66.9 OBESITY, CLASS I, BMI 30-34.9: ICD-10-CM

## 2023-03-07 DIAGNOSIS — G47.00 INSOMNIA, UNSPECIFIED TYPE: Primary | Chronic | ICD-10-CM

## 2023-03-07 PROBLEM — F51.01 PRIMARY INSOMNIA: Chronic | Status: ACTIVE | Noted: 2019-02-25

## 2023-03-07 PROBLEM — F10.11 HISTORY OF ALCOHOL ABUSE: Chronic | Status: ACTIVE | Noted: 2019-02-25

## 2023-03-07 LAB — HBA1C MFR BLD: 6 % (ref 4.8–5.6)

## 2023-03-07 PROCEDURE — 83036 HEMOGLOBIN GLYCOSYLATED A1C: CPT

## 2023-03-07 PROCEDURE — 99214 OFFICE O/P EST MOD 30 MIN: CPT | Performed by: STUDENT IN AN ORGANIZED HEALTH CARE EDUCATION/TRAINING PROGRAM

## 2023-03-07 RX ORDER — HYDROXYZINE HYDROCHLORIDE 25 MG/1
12.5-25 TABLET, FILM COATED ORAL NIGHTLY PRN
Qty: 30 TABLET | Refills: 2 | Status: SHIPPED | OUTPATIENT
Start: 2023-03-07

## 2023-03-07 RX ORDER — ZOLPIDEM TARTRATE 10 MG/1
5-10 TABLET ORAL NIGHTLY
Qty: 30 TABLET | Refills: 2 | Status: SHIPPED | OUTPATIENT
Start: 2023-03-07

## 2023-03-07 NOTE — PROGRESS NOTES
"Chief Complaint  Braydon Eugene is a 58 y.o. male presenting for Med Refill (F/u ).     From [place]. . He has 3 children from previous marriage, his wife has 2 children from previous marriage. They all live in [place]. 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    Patient has a past medical history of hyperlipidemia, prediabetes, BPH, insomnia, history of alcohol use (sober since 2003) and obesity.    History of Present Illness  Patient is here to transfer care from Mady Lopez who is leaving her practice.    Patient does have a remote history of alcohol use disorder, he has been sober since 2003.    He has been on zolpidem 10 mg daily for years and is now able to get much sleep without it.  He is motivated for decreasing the dose and potentially getting off it if he can.    He also has a history of prediabetes, his blood sugar has been slowly trending up.    The following portions of the patient's history were reviewed and updated as appropriate: allergies, current medications, past family history, past medical history, past social history, past surgical history and problem list.    Objective  /86 (BP Location: Left arm, Patient Position: Sitting, Cuff Size: Large Adult)   Pulse 60   Temp 98 °F (36.7 °C) (Temporal)   Ht 181.6 cm (71.5\")   Wt 106 kg (234 lb 9.6 oz)   BMI 32.27 kg/m²     Physical Exam  Constitutional:       Appearance: Normal appearance. He is obese.   Eyes:      Extraocular Movements: Extraocular movements intact.      Conjunctiva/sclera: Conjunctivae normal.   Pulmonary:      Effort: Pulmonary effort is normal. No respiratory distress.   Musculoskeletal:      Cervical back: Neck supple.   Neurological:      Mental Status: He is alert and oriented to person, place, and time. Mental status is at baseline.   Psychiatric:         Behavior: Behavior normal.         Thought Content: Thought content normal.         Assessment/Plan   1. Insomnia, unspecified " type  Counseled on sleep hygiene.  Qasim reviewed appropriate.  Treatment contract reviewed with patient, counseled accordingly, patient has signed in my presence.  I did  him on increased risk for falls later on in life when on this medication.  UDS on file.  I recommend we try to decrease the zolpidem so he can take 10 mg either half a tablet, or up to 1 tablet if needed.  He can overlap with hydroxyzine 25 mg half a tablet about an hour before bedtime.  If he tolerates the lower dose of zolpidem over some weeks we can consider decreasing the dose further and potentially increasing the dose of Atarax.    - hydrOXYzine (ATARAX) 25 MG tablet; Take 0.5-1 tablets by mouth At Night As Needed for Anxiety (and sleep).  Dispense: 30 tablet; Refill: 2  - zolpidem (AMBIEN) 10 MG tablet; Take 0.5-1 tablets by mouth Every Night. for sleep  Dispense: 30 tablet; Refill: 2    2. Prediabetes  Hemoglobin A1C   Date Value Ref Range Status   08/18/2022 6.20 (H) 4.80 - 5.60 % Final   07/20/2021 5.90 (H) 4.80 - 5.60 % Final   07/06/2020 6.00 (H) 4.80 - 5.60 % Final   We will recheck A1c today.  Discussed trial of metformin to lower blood sugar.  Patient amenable.  Discussed side effects including gastrointestinal upset and diarrhea.  If any significant symptoms I would discontinue the medication, but hopefully he will tolerate the low-dose  - metFORMIN (Glucophage) 500 MG tablet; Take 1 tablet by mouth Daily With Breakfast.  Dispense: 30 tablet; Refill: 3  - Hemoglobin A1c; Future    3. Obesity, Class I, BMI 30-34.9  BMI is >= 30 and <35. (Class 1 Obesity). The following options were offered after discussion;: exercise counseling/recommendations and nutrition counseling/recommendations      Return in about 3 months (around 6/7/2023) for Recheck.    Future Appointments       Provider Department Center    6/9/2023 9:00 AM Thomas Baltazar MD [facility] INTERNAL MEDICINE [facility]            Thomas Baltazar,  MD  Family Medicine  03/07/2023

## 2023-06-19 ENCOUNTER — OFFICE VISIT (OUTPATIENT)
Dept: INTERNAL MEDICINE | Facility: CLINIC | Age: 58
End: 2023-06-19
Payer: COMMERCIAL

## 2023-06-19 VITALS
BODY MASS INDEX: 32.84 KG/M2 | HEIGHT: 71 IN | OXYGEN SATURATION: 96 % | SYSTOLIC BLOOD PRESSURE: 120 MMHG | WEIGHT: 234.6 LBS | HEART RATE: 64 BPM | TEMPERATURE: 98.4 F | DIASTOLIC BLOOD PRESSURE: 84 MMHG

## 2023-06-19 DIAGNOSIS — R73.03 PREDIABETES: Chronic | ICD-10-CM

## 2023-06-19 DIAGNOSIS — Z13.220 LIPID SCREENING: ICD-10-CM

## 2023-06-19 DIAGNOSIS — M47.816 SPONDYLOSIS OF LUMBAR REGION WITHOUT MYELOPATHY OR RADICULOPATHY: Chronic | ICD-10-CM

## 2023-06-19 DIAGNOSIS — Z12.5 SCREENING PSA (PROSTATE SPECIFIC ANTIGEN): ICD-10-CM

## 2023-06-19 DIAGNOSIS — F51.01 PRIMARY INSOMNIA: Primary | Chronic | ICD-10-CM

## 2023-06-19 PROCEDURE — 99214 OFFICE O/P EST MOD 30 MIN: CPT | Performed by: STUDENT IN AN ORGANIZED HEALTH CARE EDUCATION/TRAINING PROGRAM

## 2023-06-19 RX ORDER — TIZANIDINE 4 MG/1
4 TABLET ORAL EVERY 8 HOURS PRN
COMMUNITY
Start: 2023-05-31

## 2023-06-19 NOTE — PROGRESS NOTES
"Chief Complaint  Braydon Eugene is a 58 y.o. male presenting for Med Refill.     From [place]. . He has 3 children from previous marriage, his wife has 2 children from previous marriage. They all live in [place]. 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     Patient has a past medical history of hyperlipidemia, prediabetes, BPH, lumbar DJD, insomnia, history of alcohol use (sober since 2003) and obesity.    History of Present Illness  Patient is here for follow-up of his insomnia.  He has cut back on Ambien and usually is able to take just 5 mg for sleep, he has started on Atarax to assist tapering.  He would like to continue tapering.    Patient has a history of degenerative disease of lumbar spine.  He has an x-ray at his chiropractor.  He tells me this presented first time 2-3 years ago when he woke up with lower back pain.  Went to chiropractor and this gradually resolved.  He had another episode 1 year later.  Again went to chiropractor for a few weeks and symptoms resolved.  About 2 months ago he was taking 110 foot trench and developed pain again afterwards.  He went to the chiropractor and is now starting to get better.  He still feels some stiffness, occasional pain shooting down his left lower extremity, but this has now resolved.  No weakness.  He has taken ibuprofen 600 mg every 6 hours, also added Tylenol and tizanidine.    Of note he is going fishing tomorrow     The following portions of the patient's history were reviewed and updated as appropriate: allergies, current medications, past family history, past medical history, past social history, past surgical history, and problem list.    Objective  /84 (BP Location: Left arm, Patient Position: Sitting, Cuff Size: Large Adult)   Pulse 64   Temp 98.4 °F (36.9 °C) (Temporal)   Ht 181.6 cm (71.5\")   Wt 106 kg (234 lb 9.6 oz)   SpO2 96%   BMI 32.27 kg/m²     Physical Exam  Vitals reviewed.   Constitutional:       Appearance: " Normal appearance.   HENT:      Head: Normocephalic and atraumatic.      Nose: Nose normal. No congestion.      Mouth/Throat:      Mouth: Mucous membranes are moist.   Eyes:      Extraocular Movements: Extraocular movements intact.      Conjunctiva/sclera: Conjunctivae normal.   Cardiovascular:      Rate and Rhythm: Normal rate and regular rhythm.      Heart sounds: Normal heart sounds. No murmur heard.  Pulmonary:      Effort: Pulmonary effort is normal.      Breath sounds: Normal breath sounds.   Musculoskeletal:         General: No tenderness.      Cervical back: Neck supple.      Comments: Able to get on and off the exam table without difficulty.  Nontender on palpation of the lumbar spine and paravertebral area.   Skin:     General: Skin is warm and dry.   Neurological:      Mental Status: He is alert and oriented to person, place, and time. Mental status is at baseline.      Motor: No weakness.      Gait: Gait normal.      Comments: Raised leg test negative to 85 degrees bilaterally without any radiating symptoms.   Psychiatric:         Behavior: Behavior normal.         Thought Content: Thought content normal.       Assessment/Plan   1. Primary insomnia  Appears to be doing well.  His back issue has caused him to take a full dose of Ambien 10 mg on occasion, but he is mostly able to do 5 mg.  We will continue to monitor and taper as tolerated.  Follow-up in 3 months for annual physical.    2. Spondylosis of lumbar region without myelopathy or radiculopathy  Overall stable, with recurrent episodes of pain.  He is feeling better since yesterday.  Counseled on continued exercise, using Tylenol and ibuprofen for symptom relief.  Continuing self exercise.  If any worsening symptoms with pain shooting down one of the other leg, weakness or numbness of one of the other foot he should get in touch right away.    3. Lipid screening  We will do fasting lipids before next visit  - Lipid Panel; Future    4. Screening PSA  (prostate specific antigen)  Patient wants to continue screening for prostate cancer.  - PSA Screen; Future    5. Prediabetes  - Comprehensive Metabolic Panel; Future  - Hemoglobin A1c; Future    Return in about 13 weeks (around 9/18/2023) for Annual physical.    Future Appointments         Provider Department Center    9/19/2023 8:00 AM Thomas Baltazar MD [facility] INTERNAL MEDICINE [facility]            Thomas Baltazar MD  Family Medicine  06/19/2023

## 2023-07-31 RX ORDER — DOXAZOSIN 2 MG/1
2 TABLET ORAL DAILY
Qty: 90 TABLET | Refills: 2 | Status: SHIPPED | OUTPATIENT
Start: 2023-07-31

## 2023-08-03 ENCOUNTER — TELEPHONE (OUTPATIENT)
Dept: INTERNAL MEDICINE | Facility: CLINIC | Age: 58
End: 2023-08-03

## 2023-08-03 ENCOUNTER — OFFICE VISIT (OUTPATIENT)
Dept: INTERNAL MEDICINE | Facility: CLINIC | Age: 58
End: 2023-08-03
Payer: COMMERCIAL

## 2023-08-03 VITALS
HEART RATE: 64 BPM | WEIGHT: 236.8 LBS | SYSTOLIC BLOOD PRESSURE: 118 MMHG | DIASTOLIC BLOOD PRESSURE: 80 MMHG | BODY MASS INDEX: 33.15 KG/M2 | TEMPERATURE: 98.5 F | HEIGHT: 71 IN

## 2023-08-03 DIAGNOSIS — M47.816 SPONDYLOSIS OF LUMBAR REGION WITHOUT MYELOPATHY OR RADICULOPATHY: Chronic | ICD-10-CM

## 2023-08-03 DIAGNOSIS — H81.10 BENIGN PAROXYSMAL POSITIONAL VERTIGO, UNSPECIFIED LATERALITY: Primary | ICD-10-CM

## 2023-08-03 DIAGNOSIS — F51.01 PRIMARY INSOMNIA: Chronic | ICD-10-CM

## 2023-08-03 DIAGNOSIS — R73.03 PREDIABETES: Chronic | ICD-10-CM

## 2023-08-03 LAB
EXPIRATION DATE: NORMAL
HBA1C MFR BLD: 5.9 %
Lab: NORMAL

## 2023-08-16 DIAGNOSIS — G47.00 INSOMNIA, UNSPECIFIED TYPE: Chronic | ICD-10-CM

## 2023-08-16 RX ORDER — ZOLPIDEM TARTRATE 10 MG/1
TABLET ORAL
Qty: 30 TABLET | Refills: 0 | Status: SHIPPED | OUTPATIENT
Start: 2023-08-16

## 2023-08-16 NOTE — TELEPHONE ENCOUNTER
Rx Refill Note  Requested Prescriptions     Pending Prescriptions Disp Refills    zolpidem (AMBIEN) 10 MG tablet [Pharmacy Med Name: Zolpidem Tartrate 10 MG Oral Tablet] 30 tablet 0     Sig: TAKE 1/2 TO 1 (ONE-HALF TO ONE) TABLET BY MOUTH ONCE DAILY AT NIGHT FOR SLEEP      Last office visit with prescribing clinician: 8/3/2023   Last telemedicine visit with prescribing clinician: Visit date not found   Next office visit with prescribing clinician: 9/19/2023                         Would you like a call back once the refill request has been completed: [] Yes [] No    If the office needs to give you a call back, can they leave a voicemail: [] Yes [] No    Nataliia Malloy LPN  08/16/23, 10:49 EDT

## 2023-08-18 DIAGNOSIS — G47.00 INSOMNIA, UNSPECIFIED TYPE: Chronic | ICD-10-CM

## 2023-08-18 RX ORDER — ZOLPIDEM TARTRATE 10 MG/1
TABLET ORAL
Qty: 30 TABLET | Refills: 0 | OUTPATIENT
Start: 2023-08-18

## 2023-08-18 NOTE — TELEPHONE ENCOUNTER
Caller: Betzaida Eugene    Relationship: Emergency Contact    Best call back number: [phone]     Requested Prescriptions:   Requested Prescriptions     Pending Prescriptions Disp Refills    zolpidem (AMBIEN) 10 MG tablet 30 tablet 0        Pharmacy where request should be sent: [facility] [address] [place] [address] [phone] [facility] [phone]      Last office visit with prescribing clinician: 8/3/2023   Last telemedicine visit with prescribing clinician: Visit date not found   Next office visit with prescribing clinician: 9/19/2023     Additional details provided by patient: PATIENT WAS TOLD THAT PHARMACY DID NOT RECEIVE THIS ORDER.     Does the patient have less than a 3 day supply:  [x] Yes  [] No    Would you like a call back once the refill request has been completed: [] Yes [x] No    If the office needs to give you a call back, can they leave a voicemail: [] Yes [x] No    Kenton Bustos Rep   08/18/23 10:47 EDT

## 2023-09-01 ENCOUNTER — TELEMEDICINE (OUTPATIENT)
Dept: INTERNAL MEDICINE | Facility: CLINIC | Age: 58
End: 2023-09-01
Payer: COMMERCIAL

## 2023-09-01 ENCOUNTER — TELEPHONE (OUTPATIENT)
Dept: INTERNAL MEDICINE | Facility: CLINIC | Age: 58
End: 2023-09-01

## 2023-09-01 DIAGNOSIS — E78.2 MIXED HYPERLIPIDEMIA: Chronic | ICD-10-CM

## 2023-09-01 DIAGNOSIS — J30.1 SEASONAL ALLERGIC RHINITIS DUE TO POLLEN: Chronic | ICD-10-CM

## 2023-09-01 DIAGNOSIS — U07.1 COVID-19 VIRUS INFECTION: Primary | ICD-10-CM

## 2023-09-01 PROCEDURE — 99214 OFFICE O/P EST MOD 30 MIN: CPT | Performed by: STUDENT IN AN ORGANIZED HEALTH CARE EDUCATION/TRAINING PROGRAM

## 2023-09-01 NOTE — PROGRESS NOTES
You have chosen to receive care through a telehealth visit.  Do you consent to use a video/audio connection for your medical care today? Yes     From [place]. . He has 3 children from previous marriage, his wife has 2 children from previous marriage. They all live in [place]. 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     Patient has a past medical history of hyperlipidemia, prediabetes, seasonal allergies, BPH, lumbar DJD, insomnia, history of alcohol use (sober since 2003) and obesity.    Chief Complaint  Braydon Eugene is a 58 y.o. male who presents via video-conference for COVID infection.    Patient ID confirmed. Patient located at home in [place]. MD located in clinic.    History of Present Illness  Patient seen by video.  Symptoms started 2-3 days ago with runny nose.  He took some cetirizine thinking it was related to his allergies.  Yesterday he was out golfing and did not feel so well.  He also mowed his lawn.  He took a shower and went to bed.  Around 2 AM he woke up with flulike symptoms, aching, having chills.  Today fever with temperature 102.3.  Significant headache.  Mild cough, no shortness of breath or chest pain.  Also sore throat and some nasal congestion.  He is laying in bed, but is able to get around without difficulty.  Able to drink fluids and keep down his food.    The following portions of the patient's history were reviewed and updated as appropriate: allergies, current medications, past family history, past medical history, past social history, past surgical history, and problem list.    Review of Systems    Objective  Vital signs available: Yes  Temperature 102.3 F.  Patient fairly well-appearing on video.  AAO X3.  Speaking in full sentences.    Assessment/Plan   1. COVID-19 virus infection  Tested positive for COVID at home today.  Symptoms consistent with infection.  He is within 5 days of symptoms started.  Never had COVID before, had his last booster at the end of  2022.  Recommend treatment with Paxlovid.  Reviewed labs and medications, normal kidney function.  Hold atorvastatin for the 5 days he is on medication.  Counseled on potential for rebound symptoms.  Counseled on symptoms of severe COVID, including chest pain, severe difficulties breathing, weakness or not being able to stay on his feet, confusion or inability to keep down fluids/hydrate.  Any of those symptoms should lead to emergency room evaluation.  He can take Tylenol and Advil for symptom relief.  Also Mucinex.  Isolate for 5 days counting from day of symptoms start, on day 6 he can just isolation if he is doing better and wear a mask at least up to 10 days.  - Nirmatrelvir&Ritonavir 300/100 (PAXLOVID) 20 x 150 MG & 10 x 100MG tablet therapy pack tablet; Take 3 tablets by mouth 2 (Two) Times a Day for 5 days. Stop atorvastatin for 5 days  Dispense: 30 tablet; Refill: 0    2. Mixed hyperlipidemia  We will hold atorvastatin for 5 days, then restart.    3. Seasonal allergic rhinitis due to pollen  Could be component of allergies prior to symptom onset yesterday.  He can continue to use cetirizine.      Return if symptoms worsen or fail to improve.    Thomas Baltazar MD  Family Medicine  09/01/2023

## 2023-09-01 NOTE — TELEPHONE ENCOUNTER
Spoke with pt and he has a fever of 102.2, chills, tingling, numbness in his hands, sore throat, muscle aches, weakness, clammy skin, and his eye feel like they have sand in them. Advised to do a home COVID test and scheduled him for telehealth today at 2:30.

## 2023-09-01 NOTE — TELEPHONE ENCOUNTER
Caller: Betzaida Eugene    Relationship: Emergency Contact    Best call back number: [phone]       What was the call regarding: PATIENT WAS HAVING COLD CHILLS LAST NIGHT,CONGESTION, COUGH, ALLERGY SYMPTOMS, SORE THROAT, DRAINAGE. FEELS LIKE HE CAN NOT WALK. CALL WITH ANY UPDATES.     [address] - [phone] [place] [phone] [facility] [phone]       Is it okay if the provider responds through MyChart: NO

## 2023-09-01 NOTE — TELEPHONE ENCOUNTER
Not sure if I understand this message correctly.  He is having symptoms concerning for infection, for that I would check for COVID.  Is he not able to walk?  Has he been on his feet?  Is he week because he is ill?  If he is severely ill I would consider going to the emergency room.  Is someone with him?

## 2023-09-11 ENCOUNTER — TELEPHONE (OUTPATIENT)
Dept: INTERNAL MEDICINE | Facility: CLINIC | Age: 58
End: 2023-09-11
Payer: COMMERCIAL

## 2023-09-11 DIAGNOSIS — J98.8 RESPIRATORY INFECTION: Primary | ICD-10-CM

## 2023-09-11 RX ORDER — AZITHROMYCIN 250 MG/1
TABLET, FILM COATED ORAL
Qty: 6 TABLET | Refills: 0 | Status: SHIPPED | OUTPATIENT
Start: 2023-09-11

## 2023-09-11 NOTE — TELEPHONE ENCOUNTER
Caller: Betzaida Eugene    Relationship: Emergency Contact    Best call back number: [phone]     What is the best time to reach you: ANY    Who are you requesting to speak with (clinical staff, provider,  specific staff member): CLINICAL    Do you know the name of the person who called: BETZAIDA    What was the call regarding: PATIENTS WIFE SAYS ALEX IS STILL VERY CONGESTED AND COUGHING AFTER THE COVID POSITIVE.  GOING ON ALMOST 2 WEEKS.  THEY WOULD LIKE TO KNOW IF ANYTHING COULD BE CALLED IN TO HELP WITH THE SYMPTOMS.    Is it okay if the provider responds through MyChart: NO

## 2023-09-18 RX ORDER — ATORVASTATIN CALCIUM 20 MG/1
TABLET, FILM COATED ORAL
Qty: 30 TABLET | Refills: 5 | Status: SHIPPED | OUTPATIENT
Start: 2023-09-18

## 2023-09-18 NOTE — TELEPHONE ENCOUNTER
Rx Refill Note  Requested Prescriptions     Pending Prescriptions Disp Refills    atorvastatin (LIPITOR) 20 MG tablet [Pharmacy Med Name: Atorvastatin Calcium 20 MG Oral Tablet] 30 tablet 0     Sig: Take 1 tablet by mouth once daily      Last office visit with prescribing clinician: 09/01/23   Next office visit with prescribing clinician: 10/16/23                            Would you like a call back once the refill request has been completed: [] Yes [] No    If the office needs to give you a call back, can they leave a voicemail: [] Yes [] No    Meghan Urena LPN  09/18/23, 16:52 EDT

## 2023-09-25 DIAGNOSIS — G47.00 INSOMNIA, UNSPECIFIED TYPE: Chronic | ICD-10-CM

## 2023-09-25 RX ORDER — ZOLPIDEM TARTRATE 10 MG/1
TABLET ORAL
Qty: 30 TABLET | Refills: 0 | Status: SHIPPED | OUTPATIENT
Start: 2023-09-25

## 2023-10-12 ENCOUNTER — LAB (OUTPATIENT)
Dept: LAB | Facility: HOSPITAL | Age: 58
End: 2023-10-12
Payer: COMMERCIAL

## 2023-10-12 DIAGNOSIS — Z13.220 LIPID SCREENING: ICD-10-CM

## 2023-10-12 DIAGNOSIS — Z12.5 SCREENING PSA (PROSTATE SPECIFIC ANTIGEN): ICD-10-CM

## 2023-10-12 DIAGNOSIS — R73.03 PREDIABETES: Chronic | ICD-10-CM

## 2023-10-12 LAB
ALBUMIN SERPL-MCNC: 4.7 G/DL (ref 3.5–5.2)
ALBUMIN/GLOB SERPL: 1.8 G/DL
ALP SERPL-CCNC: 71 U/L (ref 39–117)
ALT SERPL W P-5'-P-CCNC: 39 U/L (ref 1–41)
ANION GAP SERPL CALCULATED.3IONS-SCNC: 7 MMOL/L (ref 5–15)
AST SERPL-CCNC: 30 U/L (ref 1–40)
BILIRUB SERPL-MCNC: 0.4 MG/DL (ref 0–1.2)
BUN SERPL-MCNC: 10 MG/DL (ref 6–20)
BUN/CREAT SERPL: 7.9 (ref 7–25)
CALCIUM SPEC-SCNC: 9.4 MG/DL (ref 8.6–10.5)
CHLORIDE SERPL-SCNC: 104 MMOL/L (ref 98–107)
CHOLEST SERPL-MCNC: 128 MG/DL (ref 0–200)
CO2 SERPL-SCNC: 27 MMOL/L (ref 22–29)
CREAT SERPL-MCNC: 1.27 MG/DL (ref 0.76–1.27)
EGFRCR SERPLBLD CKD-EPI 2021: 66.3 ML/MIN/1.73
GLOBULIN UR ELPH-MCNC: 2.6 GM/DL
GLUCOSE SERPL-MCNC: 112 MG/DL (ref 65–99)
HDLC SERPL-MCNC: 31 MG/DL (ref 40–60)
LDLC SERPL CALC-MCNC: 77 MG/DL (ref 0–100)
LDLC/HDLC SERPL: 2.42 {RATIO}
POTASSIUM SERPL-SCNC: 4.4 MMOL/L (ref 3.5–5.2)
PROT SERPL-MCNC: 7.3 G/DL (ref 6–8.5)
PSA SERPL-MCNC: 0.5 NG/ML (ref 0–4)
SODIUM SERPL-SCNC: 138 MMOL/L (ref 136–145)
TRIGL SERPL-MCNC: 110 MG/DL (ref 0–150)
VLDLC SERPL-MCNC: 20 MG/DL (ref 5–40)

## 2023-10-12 PROCEDURE — 80061 LIPID PANEL: CPT

## 2023-10-12 PROCEDURE — G0103 PSA SCREENING: HCPCS

## 2023-10-12 PROCEDURE — 80053 COMPREHEN METABOLIC PANEL: CPT

## 2023-10-16 ENCOUNTER — OFFICE VISIT (OUTPATIENT)
Dept: INTERNAL MEDICINE | Facility: CLINIC | Age: 58
End: 2023-10-16
Payer: COMMERCIAL

## 2023-10-16 VITALS
DIASTOLIC BLOOD PRESSURE: 86 MMHG | BODY MASS INDEX: 33.23 KG/M2 | HEART RATE: 72 BPM | WEIGHT: 237.4 LBS | HEIGHT: 71 IN | SYSTOLIC BLOOD PRESSURE: 136 MMHG | TEMPERATURE: 98 F

## 2023-10-16 DIAGNOSIS — R73.03 PREDIABETES: Chronic | ICD-10-CM

## 2023-10-16 DIAGNOSIS — Z00.00 WELL ADULT EXAM: Primary | ICD-10-CM

## 2023-10-16 DIAGNOSIS — E78.2 MIXED HYPERLIPIDEMIA: Chronic | ICD-10-CM

## 2023-10-16 DIAGNOSIS — F51.01 PRIMARY INSOMNIA: Chronic | ICD-10-CM

## 2023-10-16 DIAGNOSIS — E66.9 OBESITY, CLASS I, BMI 30-34.9: ICD-10-CM

## 2023-10-16 DIAGNOSIS — M47.816 SPONDYLOSIS OF LUMBAR REGION WITHOUT MYELOPATHY OR RADICULOPATHY: Chronic | ICD-10-CM

## 2023-10-16 RX ORDER — ZOLPIDEM TARTRATE 10 MG/1
10 TABLET ORAL NIGHTLY PRN
Qty: 30 TABLET | Refills: 0 | Status: SHIPPED | OUTPATIENT
Start: 2023-10-16

## 2023-10-16 NOTE — PROGRESS NOTES
"Chief Complaint  Braydon Eugene is a 58 y.o. male presenting for Prediabetes and Annual Exam.     From [place]. . He has 3 children from previous marriage, his wife has 2 children from previous marriage. They all live in [place]. 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     Patient has a past medical history of hyperlipidemia, prediabetes, seasonal allergies, BPH, lumbar DJD, insomnia, history of alcohol use (sober since 2003) and obesity.    History of Present Illness  Patient is here for follow-up.  He also is requesting annual physical done today.  He did recently go for blood work, but does not have access to his MyChart, and would like us to review today.    He has been suffering with his back for longer time, but finally over the last 1 to 2 weeks his left sciatica has resolved.  He has been doing stretches and exercises and is happy to feel better.    He also was scheduled to see ENT with concern for BPPV, he had to cancel when he was ill, and has since not to reschedule.  However his symptoms have resolved completely and he has not had more episodes.  Of note he still has mild cough lingering, but improving.    Patient does see the dentist regularly, typically twice yearly.  He has upcoming appointment.    Patient did not start metformin for his prediabetes, he heard a lot of negative reviews on it and was skeptical to start it.    The following portions of the patient's history were reviewed and updated as appropriate: allergies, current medications, past family history, past medical history, past social history, past surgical history, and problem list.    Objective  /86 (BP Location: Left arm, Patient Position: Sitting, Cuff Size: Large Adult)   Pulse 72   Temp 98 °F (36.7 °C) (Temporal)   Ht 181.6 cm (71.5\")   Wt 108 kg (237 lb 6.4 oz)   BMI 32.65 kg/m²     Physical Exam  Vitals reviewed.   Constitutional:       Appearance: Normal appearance.   HENT:      Head: Normocephalic " and atraumatic.      Right Ear: Tympanic membrane, ear canal and external ear normal. There is no impacted cerumen.      Left Ear: Tympanic membrane, ear canal and external ear normal. There is no impacted cerumen.      Nose: Nose normal. No congestion.      Mouth/Throat:      Mouth: Mucous membranes are moist.      Pharynx: Oropharynx is clear.   Eyes:      Extraocular Movements: Extraocular movements intact.      Conjunctiva/sclera: Conjunctivae normal.   Cardiovascular:      Rate and Rhythm: Normal rate and regular rhythm.      Heart sounds: Normal heart sounds. No murmur heard.  Pulmonary:      Effort: Pulmonary effort is normal.      Breath sounds: Normal breath sounds.   Abdominal:      General: There is no distension.      Palpations: Abdomen is soft. There is no mass.      Tenderness: There is no abdominal tenderness.   Musculoskeletal:      Cervical back: Neck supple. No tenderness.      Right lower leg: No edema.      Left lower leg: No edema.   Lymphadenopathy:      Cervical: No cervical adenopathy.   Skin:     General: Skin is warm and dry.   Neurological:      Mental Status: He is alert and oriented to person, place, and time. Mental status is at baseline.   Psychiatric:         Behavior: Behavior normal.         Thought Content: Thought content normal.         Assessment/Plan   1. Well adult exam  Counseled on recommendations for annual flu shot.  Patient declines, he has not had flu shot for the last 6-7 years, he got ill after getting flu shot in the past and would rather avoid.  Also counseled on recommendations for COVID booster.  Counseled on recommendations for regular dental visits, patient goes regularly.  Counseled on prostate cancer screening, his recent PSA was normal.  If he would start to develop worsening lower urinary tract symptoms like increased urination at night, blood in the urine, or any other concerning symptoms we still have to consider the possibility of underlying cancer, but  with PSA at 0.5 and stable the possibility of prostate cancer is very low.    2. Spondylosis of lumbar region without myelopathy or radiculopathy  Symptoms improved after doing self exercises.  For now we will continue to monitor.  If symptoms come back I would consider pain management for injections and possible MRI.    3. Prediabetes  Hemoglobin A1C   Date Value Ref Range Status   08/03/2023 5.9 % Final   03/07/2023 6.00 (H) 4.80 - 5.60 % Final   08/18/2022 6.20 (H) 4.80 - 5.60 % Final   07/20/2021 5.90 (H) 4.80 - 5.60 % Final   Overall his prediabetes is has been fairly stable.  There is benefit to starting on metformin which will lower blood sugar, but some patients do not tolerate.  He will think about it and may be give it a try, but if he prefers to hold off we can continue to monitor without medication and focus on lifestyle.  Recheck in about 5 months.    4. Mixed hyperlipidemia  His cholesterol profile looks good.  I will continue on atorvastatin 20 mg.    5. Primary insomnia  Treatment contract on file.  Qasim reviewed and appropriate.  Patient tells me his insurance will only approve 15 tablets at a time, which means almost 2 visits monthly to the pharmacy.  He mostly takes 1 tablet at night, he can always take half a tablet if he pleases, but I will change the instructions for now  - zolpidem (AMBIEN) 10 MG tablet; Take 1 tablet by mouth At Night As Needed for Sleep.  Dispense: 30 tablet; Refill: 0    6. Obesity, Class I, BMI 30-34.9     Counseled on recommendations for moderate intensity exercise 30 minutes a day, 5 days a week.      Return in about 5 months (around 3/16/2024) for Recheck.    Thomas Baltazar MD  Family Medicine  10/16/2023

## 2024-01-12 DIAGNOSIS — F51.01 PRIMARY INSOMNIA: Chronic | ICD-10-CM

## 2024-01-12 RX ORDER — ZOLPIDEM TARTRATE 10 MG/1
10 TABLET ORAL NIGHTLY PRN
Qty: 30 TABLET | Refills: 0 | Status: SHIPPED | OUTPATIENT
Start: 2024-01-12

## 2024-01-12 NOTE — TELEPHONE ENCOUNTER
Rx Refill Note  Requested Prescriptions     Pending Prescriptions Disp Refills    zolpidem (AMBIEN) 10 MG tablet [Pharmacy Med Name: Zolpidem Tartrate 10 MG Oral Tablet] 30 tablet 0     Sig: TAKE 1 TABLET BY MOUTH AT NIGHT AS NEEDED FOR SLEEP      Last office visit with prescribing clinician: 10/16/2023   Last telemedicine visit with prescribing clinician: 9/1/2023   Next office visit with prescribing clinician: 3/20/2024     LA: 10/16/23 #30 0R                          Would you like a call back once the refill request has been completed: [] Yes [] No    If the office needs to give you a call back, can they leave a voicemail: [] Yes [] No    Meghan Urena LPN  01/12/24, 10:08 EST

## 2024-03-03 DIAGNOSIS — F51.01 PRIMARY INSOMNIA: Chronic | ICD-10-CM

## 2024-03-04 RX ORDER — ZOLPIDEM TARTRATE 10 MG/1
10 TABLET ORAL NIGHTLY PRN
Qty: 30 TABLET | Refills: 0 | Status: SHIPPED | OUTPATIENT
Start: 2024-03-04

## 2024-03-04 NOTE — TELEPHONE ENCOUNTER
Caller: Braydon Eugene    Relationship: Self    Best call back number: [phone]     Requested Prescriptions:   Requested Prescriptions     Pending Prescriptions Disp Refills    zolpidem (AMBIEN) 10 MG tablet [Pharmacy Med Name: Zolpidem Tartrate 10 MG Oral Tablet] 30 tablet 0     Sig: TAKE 1 TABLET BY MOUTH AT NIGHT AS NEEDED FOR SLEEP        Pharmacy where request should be sent: [facility] [address] [place] [address] [phone] [facility] [phone] FX     Last office visit with prescribing clinician: 10/16/2023   Last telemedicine visit with prescribing clinician: Visit date not found   Next office visit with prescribing clinician: 3/18/2024     Additional details provided by patient: OUT OF MEDICATION     Does the patient have less than a 3 day supply:  [x] Yes  [] No    Would you like a call back once the refill request has been completed: [] Yes [x] No    If the office needs to give you a call back, can they leave a voicemail: [] Yes [x] No    Kenton Deleon Rep   03/04/24 10:17 EST

## 2024-03-04 NOTE — TELEPHONE ENCOUNTER
Rx Refill Note  Requested Prescriptions     Pending Prescriptions Disp Refills    zolpidem (AMBIEN) 10 MG tablet [Pharmacy Med Name: Zolpidem Tartrate 10 MG Oral Tablet] 30 tablet 0     Sig: TAKE 1 TABLET BY MOUTH AT NIGHT AS NEEDED FOR SLEEP      Last office visit with prescribing clinician: 10/16/2023   Next office visit with prescribing clinician: 3/18/2024     LA: 01/12/24 #30 0R                          Would you like a call back once the refill request has been completed: [] Yes [] No    If the office needs to give you a call back, can they leave a voicemail: [] Yes [] No    Meghan Urena LPN  03/04/24, 10:38 EST

## 2024-03-04 NOTE — TELEPHONE ENCOUNTER
Rx Refill Note  Requested Prescriptions     Pending Prescriptions Disp Refills    zolpidem (AMBIEN) 10 MG tablet [Pharmacy Med Name: Zolpidem Tartrate 10 MG Oral Tablet] 30 tablet 0     Sig: TAKE 1 TABLET BY MOUTH AT NIGHT AS NEEDED FOR SLEEP      Last office visit with prescribing clinician: 10/16/2023   Last telemedicine visit with prescribing clinician: Visit date not found   Next office visit with prescribing clinician: 3/20/2024     LA: 01/12/24 #30 0R                          Would you like a call back once the refill request has been completed: [] Yes [] No    If the office needs to give you a call back, can they leave a voicemail: [] Yes [] No    Meghan Urena LPN  03/04/24, 08:40 EST

## 2024-03-08 RX ORDER — ATORVASTATIN CALCIUM 20 MG/1
TABLET, FILM COATED ORAL
Qty: 30 TABLET | Refills: 0 | Status: SHIPPED | OUTPATIENT
Start: 2024-03-08

## 2024-03-18 ENCOUNTER — OFFICE VISIT (OUTPATIENT)
Dept: INTERNAL MEDICINE | Facility: CLINIC | Age: 59
End: 2024-03-18
Payer: COMMERCIAL

## 2024-03-18 VITALS
OXYGEN SATURATION: 98 % | BODY MASS INDEX: 32.56 KG/M2 | SYSTOLIC BLOOD PRESSURE: 134 MMHG | HEIGHT: 72 IN | DIASTOLIC BLOOD PRESSURE: 88 MMHG | HEART RATE: 83 BPM | TEMPERATURE: 99.1 F | WEIGHT: 240.4 LBS

## 2024-03-18 DIAGNOSIS — F51.01 PRIMARY INSOMNIA: Chronic | ICD-10-CM

## 2024-03-18 DIAGNOSIS — M47.816 SPONDYLOSIS OF LUMBAR REGION WITHOUT MYELOPATHY OR RADICULOPATHY: Chronic | ICD-10-CM

## 2024-03-18 DIAGNOSIS — E66.9 OBESITY, CLASS I, BMI 30-34.9: ICD-10-CM

## 2024-03-18 DIAGNOSIS — R73.03 PREDIABETES: Primary | Chronic | ICD-10-CM

## 2024-03-18 DIAGNOSIS — E78.2 MIXED HYPERLIPIDEMIA: Chronic | ICD-10-CM

## 2024-03-18 DIAGNOSIS — H81.10 BENIGN PAROXYSMAL POSITIONAL VERTIGO, UNSPECIFIED LATERALITY: ICD-10-CM

## 2024-03-18 PROCEDURE — 99214 OFFICE O/P EST MOD 30 MIN: CPT | Performed by: STUDENT IN AN ORGANIZED HEALTH CARE EDUCATION/TRAINING PROGRAM

## 2024-03-18 NOTE — PROGRESS NOTES
"Chief Complaint  Braydon Eugene is a 59 y.o. male presenting for Hyperlipidemia.     From [place]. . He has 3 children from previous marriage, his wife has 2 children from previous marriage. They all live in [place]. 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     Patient has a past medical history of hyperlipidemia, prediabetes, seasonal allergies, BPH, lumbar DJD, insomnia, history of alcohol use (sober since 2003) and obesity.    History of Present Illness  Patient is here for follow-up.    He did gain a few pounds since his last visit.  He tries to pay attention to his diet, but he is often working and tends to get Tony's or something that is quick and easy.    He was seen at urgent care not long ago due to episode of benign vertigo.  This lasted only 2 days.  He did exercises and symptoms resolved.  He was prescribed meclizine, but he did not like the way it made him feel.    He still has some back pain, but is physically active and currently it is fairly stable.    He has still not started on metformin.  His wife had advised him not to.  He is still thinking about it.    The following portions of the patient's history were reviewed and updated as appropriate: allergies, current medications, past family history, past medical history, past social history, past surgical history, and problem list.    Objective  /88 (BP Location: Right arm, Patient Position: Sitting, Cuff Size: Adult)   Pulse 83   Temp 99.1 °F (37.3 °C) (Infrared)   Ht 181.6 cm (71.5\")   Wt 109 kg (240 lb 6.4 oz)   SpO2 98%   BMI 33.06 kg/m²     Physical Exam  Constitutional:       Appearance: Normal appearance.   HENT:      Head: Normocephalic and atraumatic.   Eyes:      Extraocular Movements: Extraocular movements intact.      Conjunctiva/sclera: Conjunctivae normal.   Pulmonary:      Effort: Pulmonary effort is normal. No respiratory distress.   Musculoskeletal:      Cervical back: Normal range of motion and neck " supple.   Skin:     General: Skin is warm and dry.   Neurological:      Mental Status: He is alert and oriented to person, place, and time. Mental status is at baseline.   Psychiatric:         Behavior: Behavior normal.         Thought Content: Thought content normal.         Assessment/Plan   1. Prediabetes  Hemoglobin A1C   Date Value Ref Range Status   08/03/2023 5.9 % Final   03/07/2023 6.00 (H) 4.80 - 5.60 % Final   08/18/2022 6.20 (H) 4.80 - 5.60 % Final   07/20/2021 5.90 (H) 4.80 - 5.60 % Final   Stable.  Will recheck A1c.  Consider starting on metformin.  Counseled on diet, cutting back on carbs, eating more nuts, more fish and protein like chicken meat or steak.  - Hemoglobin A1c; Future    2. Mixed hyperlipidemia  Continue on atorvastatin 20 mg.  Patient is due for recheck lipids in the fall  - Comprehensive Metabolic Panel; Future    3. Spondylosis of lumbar region without myelopathy or radiculopathy  Stable.      4. Primary insomnia  Stable.  He did cut back on his Ambien and take only half a tablet, but is now back taking whole tablet for good quality sleep.  His insurance only allows him to  15-day supply at the time.  Will do follow-up in about 6 months, so we can do in October with his annual, which would be in about 7 months.    5. Benign paroxysmal positional vertigo, unspecified laterality  Now resolved.  Continue to monitor.    6. Obesity, Class I, BMI 30-34.9           Return in about 7 months (around 10/16/2024) for Annual physical.    Future Appointments         Provider Department Center    10/18/2024 8:30 AM Thomas Baltazar MD [facility] INTERNAL MEDICINE [facility]              Thomas Baltazar MD  Family Medicine  03/18/2024

## 2024-04-12 RX ORDER — ATORVASTATIN CALCIUM 20 MG/1
TABLET, FILM COATED ORAL
Qty: 30 TABLET | Refills: 0 | Status: SHIPPED | OUTPATIENT
Start: 2024-04-12

## 2024-04-17 DIAGNOSIS — F51.01 PRIMARY INSOMNIA: Chronic | ICD-10-CM

## 2024-04-17 RX ORDER — ZOLPIDEM TARTRATE 10 MG/1
10 TABLET ORAL NIGHTLY PRN
Qty: 30 TABLET | Refills: 0 | Status: SHIPPED | OUTPATIENT
Start: 2024-04-17

## 2024-04-22 RX ORDER — DOXAZOSIN 2 MG/1
2 TABLET ORAL DAILY
Qty: 90 TABLET | Refills: 0 | Status: SHIPPED | OUTPATIENT
Start: 2024-04-22

## 2024-05-14 RX ORDER — ATORVASTATIN CALCIUM 20 MG/1
TABLET, FILM COATED ORAL
Qty: 30 TABLET | Refills: 0 | Status: SHIPPED | OUTPATIENT
Start: 2024-05-14

## 2024-05-31 DIAGNOSIS — F51.01 PRIMARY INSOMNIA: Chronic | ICD-10-CM

## 2024-05-31 RX ORDER — ZOLPIDEM TARTRATE 10 MG/1
10 TABLET ORAL NIGHTLY PRN
Qty: 30 TABLET | Refills: 0 | Status: SHIPPED | OUTPATIENT
Start: 2024-05-31

## 2024-05-31 NOTE — TELEPHONE ENCOUNTER
Rx Refill Note  Requested Prescriptions     Pending Prescriptions Disp Refills    zolpidem (AMBIEN) 10 MG tablet [Pharmacy Med Name: Zolpidem Tartrate 10 MG Oral Tablet] 30 tablet 0     Sig: TAKE 1 TABLET BY MOUTH AT NIGHT AS NEEDED FOR SLEEP      Last office visit with prescribing clinician: 3/18/2024   Last telemedicine visit with prescribing clinician: Visit date not found   Next office visit with prescribing clinician: 10/18/2024                         Would you like a call back once the refill request has been completed: [] Yes [] No    If the office needs to give you a call back, can they leave a voicemail: [] Yes [] No    Nataliia Malloy LPN  05/31/24, 09:49 EDT

## 2024-06-17 RX ORDER — ATORVASTATIN CALCIUM 20 MG/1
TABLET, FILM COATED ORAL
Qty: 30 TABLET | Refills: 0 | Status: SHIPPED | OUTPATIENT
Start: 2024-06-17

## 2024-07-13 DIAGNOSIS — F51.01 PRIMARY INSOMNIA: Chronic | ICD-10-CM

## 2024-07-15 RX ORDER — ZOLPIDEM TARTRATE 10 MG/1
10 TABLET ORAL NIGHTLY PRN
Qty: 30 TABLET | Refills: 0 | Status: SHIPPED | OUTPATIENT
Start: 2024-07-15

## 2024-07-15 NOTE — TELEPHONE ENCOUNTER
Rx Refill Note  Requested Prescriptions     Pending Prescriptions Disp Refills    zolpidem (AMBIEN) 10 MG tablet [Pharmacy Med Name: Zolpidem Tartrate 10 MG Oral Tablet] 30 tablet 0     Sig: TAKE 1 TABLET BY MOUTH AT NIGHT AS NEEDED FOR SLEEP      Last office visit with prescribing clinician: 3/18/2024   Next office visit with prescribing clinician: 10/18/2024     LA: 05/31/24 #30 0R                        Would you like a call back once the refill request has been completed: [] Yes [] No    If the office needs to give you a call back, can they leave a voicemail: [] Yes [] No    Meghan Urena LPN  07/15/24, 11:01 EDT

## 2024-07-17 RX ORDER — ATORVASTATIN CALCIUM 20 MG/1
TABLET, FILM COATED ORAL
Qty: 30 TABLET | Refills: 2 | Status: SHIPPED | OUTPATIENT
Start: 2024-07-17

## 2024-07-18 RX ORDER — DOXAZOSIN 2 MG/1
2 TABLET ORAL DAILY
Qty: 90 TABLET | Refills: 0 | Status: SHIPPED | OUTPATIENT
Start: 2024-07-18

## 2024-08-29 ENCOUNTER — LAB (OUTPATIENT)
Dept: LAB | Facility: HOSPITAL | Age: 59
End: 2024-08-29
Payer: COMMERCIAL

## 2024-08-29 DIAGNOSIS — R73.03 PREDIABETES: Chronic | ICD-10-CM

## 2024-08-29 DIAGNOSIS — E78.2 MIXED HYPERLIPIDEMIA: Chronic | ICD-10-CM

## 2024-08-29 LAB
ALBUMIN SERPL-MCNC: 4.7 G/DL (ref 3.5–5.2)
ALBUMIN/GLOB SERPL: 1.7 G/DL
ALP SERPL-CCNC: 72 U/L (ref 39–117)
ALT SERPL W P-5'-P-CCNC: 36 U/L (ref 1–41)
ANION GAP SERPL CALCULATED.3IONS-SCNC: 10.2 MMOL/L (ref 5–15)
AST SERPL-CCNC: 32 U/L (ref 1–40)
BILIRUB SERPL-MCNC: 0.5 MG/DL (ref 0–1.2)
BUN SERPL-MCNC: 11 MG/DL (ref 6–20)
BUN/CREAT SERPL: 9.2 (ref 7–25)
CALCIUM SPEC-SCNC: 9.4 MG/DL (ref 8.6–10.5)
CHLORIDE SERPL-SCNC: 103 MMOL/L (ref 98–107)
CO2 SERPL-SCNC: 24.8 MMOL/L (ref 22–29)
CREAT SERPL-MCNC: 1.2 MG/DL (ref 0.76–1.27)
EGFRCR SERPLBLD CKD-EPI 2021: 70.5 ML/MIN/1.73
GLOBULIN UR ELPH-MCNC: 2.7 GM/DL
GLUCOSE SERPL-MCNC: 93 MG/DL (ref 65–99)
HBA1C MFR BLD: 5.9 % (ref 4.8–5.6)
POTASSIUM SERPL-SCNC: 3.9 MMOL/L (ref 3.5–5.2)
PROT SERPL-MCNC: 7.4 G/DL (ref 6–8.5)
SODIUM SERPL-SCNC: 138 MMOL/L (ref 136–145)

## 2024-08-29 PROCEDURE — 83036 HEMOGLOBIN GLYCOSYLATED A1C: CPT

## 2024-08-29 PROCEDURE — 80053 COMPREHEN METABOLIC PANEL: CPT

## 2024-08-30 DIAGNOSIS — F51.01 PRIMARY INSOMNIA: Chronic | ICD-10-CM

## 2024-08-30 RX ORDER — ZOLPIDEM TARTRATE 10 MG/1
10 TABLET ORAL NIGHTLY PRN
Qty: 30 TABLET | Refills: 0 | Status: SHIPPED | OUTPATIENT
Start: 2024-08-30

## 2024-08-30 NOTE — TELEPHONE ENCOUNTER
Rx Refill Note  Requested Prescriptions     Pending Prescriptions Disp Refills    zolpidem (AMBIEN) 10 MG tablet [Pharmacy Med Name: Zolpidem Tartrate 10 MG Oral Tablet] 30 tablet 0     Sig: TAKE 1 TABLET BY MOUTH AT NIGHT AS NEEDED FOR SLEEP      Last office visit with prescribing clinician: 3/18/2024   Next office visit with prescribing clinician: 10/18/2024     LA: 07/15/24 #30 0R                          Would you like a call back once the refill request has been completed: [] Yes [] No    If the office needs to give you a call back, can they leave a voicemail: [] Yes [] No    Meghan Urena LPN  08/30/24, 10:23 EDT

## 2024-10-11 DIAGNOSIS — F51.01 PRIMARY INSOMNIA: Chronic | ICD-10-CM

## 2024-10-11 RX ORDER — DOXAZOSIN 2 MG/1
2 TABLET ORAL DAILY
Qty: 90 TABLET | Refills: 0 | Status: SHIPPED | OUTPATIENT
Start: 2024-10-11

## 2024-10-11 RX ORDER — ZOLPIDEM TARTRATE 10 MG/1
10 TABLET ORAL NIGHTLY PRN
Qty: 30 TABLET | Refills: 0 | Status: SHIPPED | OUTPATIENT
Start: 2024-10-11

## 2024-10-11 NOTE — TELEPHONE ENCOUNTER
Rx Refill Note  Requested Prescriptions     Pending Prescriptions Disp Refills    zolpidem (AMBIEN) 10 MG tablet [Pharmacy Med Name: Zolpidem Tartrate 10 MG Oral Tablet] 30 tablet 0     Sig: TAKE 1 TABLET BY MOUTH AT NIGHT AS NEEDED FOR SLEEP      Last office visit with prescribing clinician: 3/18/2024   Last telemedicine visit with prescribing clinician: Visit date not found   Next office visit with prescribing clinician: N/A    LA: 8/30/24 #30 0R                        Would you like a call back once the refill request has been completed: [] Yes [] No    If the office needs to give you a call back, can they leave a voicemail: [] Yes [] No    Yoli Rivas MA  10/11/24, 12:04 EDT

## 2024-10-11 NOTE — TELEPHONE ENCOUNTER
Rx Refill Note  Requested Prescriptions     Pending Prescriptions Disp Refills    zolpidem (AMBIEN) 10 MG tablet [Pharmacy Med Name: Zolpidem Tartrate 10 MG Oral Tablet] 30 tablet 0     Sig: TAKE 1 TABLET BY MOUTH AT NIGHT AS NEEDED FOR SLEEP      Last office visit with prescribing clinician: 3/18/2024   Last telemedicine visit with prescribing clinician: Visit date not found   Next office visit with prescribing clinician:                              Would you like a call back once the refill request has been completed: [] Yes [] No    If the office needs to give you a call back, can they leave a voicemail: [] Yes [] No    Yoli Rivas MA  10/11/24, 11:40 EDT

## 2024-10-14 RX ORDER — ATORVASTATIN CALCIUM 20 MG/1
TABLET, FILM COATED ORAL
Qty: 30 TABLET | Refills: 0 | Status: SHIPPED | OUTPATIENT
Start: 2024-10-14

## 2024-10-23 ENCOUNTER — OFFICE VISIT (OUTPATIENT)
Dept: INTERNAL MEDICINE | Facility: CLINIC | Age: 59
End: 2024-10-23
Payer: COMMERCIAL

## 2024-10-23 VITALS
BODY MASS INDEX: 32.59 KG/M2 | WEIGHT: 232.8 LBS | TEMPERATURE: 97.8 F | HEART RATE: 88 BPM | OXYGEN SATURATION: 97 % | DIASTOLIC BLOOD PRESSURE: 86 MMHG | SYSTOLIC BLOOD PRESSURE: 130 MMHG | HEIGHT: 71 IN

## 2024-10-23 DIAGNOSIS — Z51.81 THERAPEUTIC DRUG MONITORING: ICD-10-CM

## 2024-10-23 DIAGNOSIS — Z13.21 ENCOUNTER FOR VITAMIN DEFICIENCY SCREENING: ICD-10-CM

## 2024-10-23 DIAGNOSIS — F51.01 PRIMARY INSOMNIA: Chronic | ICD-10-CM

## 2024-10-23 DIAGNOSIS — E78.2 MIXED HYPERLIPIDEMIA: Chronic | ICD-10-CM

## 2024-10-23 DIAGNOSIS — R73.03 PREDIABETES: Chronic | ICD-10-CM

## 2024-10-23 DIAGNOSIS — B97.89 VIRAL RESPIRATORY INFECTION: Primary | ICD-10-CM

## 2024-10-23 DIAGNOSIS — J98.8 VIRAL RESPIRATORY INFECTION: Primary | ICD-10-CM

## 2024-10-23 DIAGNOSIS — Z12.5 SCREENING PSA (PROSTATE SPECIFIC ANTIGEN): ICD-10-CM

## 2024-10-23 LAB
EXPIRATION DATE: NORMAL
EXPIRATION DATE: NORMAL
FLUAV AG NPH QL: NEGATIVE
FLUBV AG NPH QL: NEGATIVE
INTERNAL CONTROL: NORMAL
INTERNAL CONTROL: NORMAL
Lab: NORMAL
Lab: NORMAL
SARS-COV-2 AG UPPER RESP QL IA.RAPID: NOT DETECTED

## 2024-10-23 PROCEDURE — 87804 INFLUENZA ASSAY W/OPTIC: CPT | Performed by: STUDENT IN AN ORGANIZED HEALTH CARE EDUCATION/TRAINING PROGRAM

## 2024-10-23 PROCEDURE — 99214 OFFICE O/P EST MOD 30 MIN: CPT | Performed by: STUDENT IN AN ORGANIZED HEALTH CARE EDUCATION/TRAINING PROGRAM

## 2024-10-23 PROCEDURE — 87426 SARSCOV CORONAVIRUS AG IA: CPT | Performed by: STUDENT IN AN ORGANIZED HEALTH CARE EDUCATION/TRAINING PROGRAM

## 2024-10-23 RX ORDER — ZOLPIDEM TARTRATE 10 MG/1
10 TABLET ORAL NIGHTLY PRN
Qty: 30 TABLET | Refills: 0 | Status: SHIPPED | OUTPATIENT
Start: 2024-10-23

## 2024-10-23 NOTE — PROGRESS NOTES
"Chief Complaint  Braydon Eugene is a 59 y.o. male presenting for URI.     From [place]. . He has 3 children from previous marriage, his wife has 2 children from previous marriage. They all live in [place]. 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     Patient has a past medical history of hyperlipidemia, prediabetes, seasonal allergies, BPH, lumbar DJD, insomnia, history of alcohol use (sober since 2003) and obesity.    History of Present Illness  Patient is here for acute visit.  He had to cancel his follow-up appointment recently.    He woke up Sunday morning 10/20/2024 with sore throat, pressure of his ears, runny nose, and within 1-2 days also developed cough with phlegm.  Some aches.  No fever or chills.  Also has some hoarseness of his voice.  Since yesterday he is feeling a little bit better.    He is requesting refill on his sleep medication.    Answers submitted by the patient for this visit:  Primary Reason for Visit (Submitted on 10/23/2024)  What is the primary reason for your visit?: Sore Throat  Sore Throat Questionnaire (Submitted on 10/23/2024)  Chief Complaint: Sore throat  Onset: in the past 7 days  Progression since onset: improving  Pain worse on: right  Fever: no fever  Pain - numeric: 3/10  abdominal pain: No  congestion: Yes  cough: Yes  diarrhea: No  drooling: No  drainage: Yes  ear pain: Yes  headaches: Yes  hoarse voice: Yes  neck pain: No  shortness of breath: Yes  swollen glands: No  trouble swallowing: No  vomiting: No      The following portions of the patient's history were reviewed and updated as appropriate: allergies, current medications, past family history, past medical history, past social history, past surgical history, and problem list.    Objective  /86 (BP Location: Left arm, Patient Position: Sitting, Cuff Size: Adult)   Pulse 88   Temp 97.8 °F (36.6 °C) (Infrared)   Ht 181.6 cm (71.5\")   Wt 106 kg (232 lb 12.8 oz)   SpO2 97%   BMI 32.02 kg/m² "     Physical Exam  Vitals reviewed.   Constitutional:       Appearance: Normal appearance.   HENT:      Head: Normocephalic and atraumatic.      Right Ear: Tympanic membrane, ear canal and external ear normal. There is no impacted cerumen.      Left Ear: Tympanic membrane, ear canal and external ear normal. There is no impacted cerumen.      Nose: Nose normal. No congestion.      Mouth/Throat:      Mouth: Mucous membranes are moist.      Pharynx: Oropharynx is clear.   Eyes:      Extraocular Movements: Extraocular movements intact.      Conjunctiva/sclera: Conjunctivae normal.   Cardiovascular:      Rate and Rhythm: Normal rate and regular rhythm.      Heart sounds: Normal heart sounds. No murmur heard.  Pulmonary:      Effort: Pulmonary effort is normal.      Breath sounds: Normal breath sounds.   Musculoskeletal:      Cervical back: Neck supple. No tenderness.   Lymphadenopathy:      Cervical: No cervical adenopathy.   Skin:     General: Skin is warm and dry.   Neurological:      Mental Status: He is alert and oriented to person, place, and time. Mental status is at baseline.   Psychiatric:         Behavior: Behavior normal.         Thought Content: Thought content normal.         Assessment/Plan   1. Viral respiratory infection  COVID and flu negative.  Likely viral infection.  Symptoms are improving slightly.  He has been using Mucinex and can safely continue.  Recommend monitoring symptoms for now.  If there would be any significant worsening over the next 1-2 weeks I have asked him to get back in touch.  - POC Influenza A / B  - POCT CHRISTINE SARS-CoV-2 Antigen AFSHIN    2. Primary insomnia  Qasim reviewed.  Will do urine check before next visit.  Continue on zolpidem.  - zolpidem (AMBIEN) 10 MG tablet; Take 1 tablet by mouth At Night As Needed for Sleep.  Dispense: 30 tablet; Refill: 0    3. Prediabetes  Hemoglobin A1C   Date Value Ref Range Status   08/29/2024 5.90 (H) 4.80 - 5.60 % Final   08/03/2023 5.9 % Final    03/07/2023 6.00 (H) 4.80 - 5.60 % Final   08/18/2022 6.20 (H) 4.80 - 5.60 % Final   Glycemic control.  A1c is fairly stable.  Will continue to monitor.  Recheck in about 6 months    4. Mixed hyperlipidemia  Patient is due for recheck of his lipids.  Continue atorvastatin 20 mg for now  - Lipid Panel; Future    5. Screening PSA (prostate specific antigen)  Patient wants to continue prostate cancer screening PSA  - PSA Screen; Future    6. Encounter for vitamin deficiency screening  - Vitamin B12; Future    7. Therapeutic drug monitoring  - Urine Drug Screen - Urine, Clean Catch; Future      Return if symptoms worsen or fail to improve, for Annual physical 4-6 weeks.    Future Appointments         Provider Department Center    12/3/2024 8:15 AM Thomas Baltazar MD [facility] INTERNAL MEDICINE [facility]              Thomas Baltazar MD  Family Medicine  10/23/2024

## 2024-11-15 ENCOUNTER — TELEPHONE (OUTPATIENT)
Dept: INTERNAL MEDICINE | Facility: CLINIC | Age: 59
End: 2024-11-15
Payer: COMMERCIAL

## 2024-11-15 DIAGNOSIS — J22 LOWER RESPIRATORY INFECTION: Primary | ICD-10-CM

## 2024-11-15 RX ORDER — AZITHROMYCIN 250 MG/1
TABLET, FILM COATED ORAL
Qty: 6 TABLET | Refills: 0 | Status: SHIPPED | OUTPATIENT
Start: 2024-11-15

## 2024-11-15 NOTE — TELEPHONE ENCOUNTER
I saw him on 10/23/2024, so if he is not getting better at all, it would be reasonable to treat with antibiotics at this time.  I have sent in Z-Mark to his pharmacy.  If he gets worse over the next few days I would consider going to urgent care for an evaluation, or come here early next week if he is not getting better.

## 2024-11-15 NOTE — TELEPHONE ENCOUNTER
Caller: ValorieBetzaida    Relationship: Emergency Contact    Best call back number: [phone]    What medication are you requesting: Z PACK OR WHATEVER THE DOCTOR THINKS IS BEST     What are your current symptoms: COUGH CONGESTION AND PHLEM     How long have you been experiencing symptoms: 2 OR 3 WEEKS     Have you had these symptoms before:    [x] Yes  [] No    Have you been treated for these symptoms before:   [x] Yes  [] No    If a prescription is needed, what is your preferred pharmacy and phone number:  [facility]     Additional notes:  THE PATIENT WAS SEEN ABOUT A WEEK AGO THE PATIENTS WIFE STATES THAT HE IS NOT GETTING ANY BETTER HE STILL HAS A LOT OF PHLEM AND COUGHING HE CAN'T SEEM TO SPIT OUT THE PHLEM THE PATIENTS WIFE WOULD LIKE TO GET A MEDICATION CALLED IN FOR HIM

## 2024-11-18 RX ORDER — ATORVASTATIN CALCIUM 20 MG/1
TABLET, FILM COATED ORAL
Qty: 30 TABLET | Refills: 0 | Status: SHIPPED | OUTPATIENT
Start: 2024-11-18

## 2024-11-18 NOTE — TELEPHONE ENCOUNTER
Rx Refill Note  Requested Prescriptions     Pending Prescriptions Disp Refills    atorvastatin (LIPITOR) 20 MG tablet [Pharmacy Med Name: Atorvastatin Calcium 20 MG Oral Tablet] 30 tablet 0     Sig: Take 1 tablet by mouth once daily      Last office visit with prescribing clinician: 10/23/2024   Last telemedicine visit with prescribing clinician: Visit date not found   Next office visit with prescribing clinician: 12/3/2024                         Would you like a call back once the refill request has been completed: [] Yes [] No    If the office needs to give you a call back, can they leave a voicemail: [] Yes [] No    Nataliia Malloy LPN  11/18/24, 08:03 EST

## 2024-12-16 RX ORDER — ATORVASTATIN CALCIUM 20 MG/1
TABLET, FILM COATED ORAL
Qty: 30 TABLET | Refills: 5 | Status: SHIPPED | OUTPATIENT
Start: 2024-12-16

## 2024-12-16 NOTE — TELEPHONE ENCOUNTER
Rx Refill Note  Requested Prescriptions     Pending Prescriptions Disp Refills    atorvastatin (LIPITOR) 20 MG tablet [Pharmacy Med Name: Atorvastatin Calcium 20 MG Oral Tablet] 30 tablet 0     Sig: Take 1 tablet by mouth once daily      Last office visit with prescribing clinician: 10/23/2024   Last telemedicine visit with prescribing clinician: Visit date not found   Next office visit with prescribing clinician: 1/7/2025                         Would you like a call back once the refill request has been completed: [] Yes [] No    If the office needs to give you a call back, can they leave a voicemail: [] Yes [] No    Liudmila Kern MA  12/16/24, 08:24 EST

## 2025-01-03 ENCOUNTER — LAB (OUTPATIENT)
Dept: LAB | Facility: HOSPITAL | Age: 60
End: 2025-01-03
Payer: COMMERCIAL

## 2025-01-03 DIAGNOSIS — Z13.21 ENCOUNTER FOR VITAMIN DEFICIENCY SCREENING: ICD-10-CM

## 2025-01-03 DIAGNOSIS — Z51.81 THERAPEUTIC DRUG MONITORING: ICD-10-CM

## 2025-01-03 DIAGNOSIS — E78.2 MIXED HYPERLIPIDEMIA: Chronic | ICD-10-CM

## 2025-01-03 DIAGNOSIS — Z12.5 SCREENING PSA (PROSTATE SPECIFIC ANTIGEN): ICD-10-CM

## 2025-01-03 LAB
AMPHET+METHAMPHET UR QL: NEGATIVE
AMPHETAMINES UR QL: NEGATIVE
BARBITURATES UR QL SCN: NEGATIVE
BENZODIAZ UR QL SCN: NEGATIVE
BUPRENORPHINE SERPL-MCNC: NEGATIVE NG/ML
CANNABINOIDS SERPL QL: POSITIVE
CHOLEST SERPL-MCNC: 138 MG/DL (ref 0–200)
COCAINE UR QL: NEGATIVE
FENTANYL UR-MCNC: NEGATIVE NG/ML
HDLC SERPL-MCNC: 25 MG/DL (ref 40–60)
LDLC SERPL CALC-MCNC: 73 MG/DL (ref 0–100)
LDLC/HDLC SERPL: 2.56 {RATIO}
METHADONE UR QL SCN: NEGATIVE
OPIATES UR QL: NEGATIVE
OXYCODONE UR QL SCN: NEGATIVE
PCP UR QL SCN: NEGATIVE
PSA SERPL-MCNC: 0.65 NG/ML (ref 0–4)
TRICYCLICS UR QL SCN: NEGATIVE
TRIGL SERPL-MCNC: 245 MG/DL (ref 0–150)
VIT B12 BLD-MCNC: 397 PG/ML (ref 211–946)
VLDLC SERPL-MCNC: 40 MG/DL (ref 5–40)

## 2025-01-03 PROCEDURE — G0103 PSA SCREENING: HCPCS

## 2025-01-03 PROCEDURE — 80061 LIPID PANEL: CPT

## 2025-01-03 PROCEDURE — 80307 DRUG TEST PRSMV CHEM ANLYZR: CPT

## 2025-01-03 PROCEDURE — 82607 VITAMIN B-12: CPT

## 2025-01-07 DIAGNOSIS — F51.01 PRIMARY INSOMNIA: Chronic | ICD-10-CM

## 2025-01-07 RX ORDER — ZOLPIDEM TARTRATE 10 MG/1
10 TABLET ORAL NIGHTLY PRN
Qty: 30 TABLET | Refills: 0 | Status: SHIPPED | OUTPATIENT
Start: 2025-01-07

## 2025-01-07 NOTE — TELEPHONE ENCOUNTER
Rx Refill Note  Requested Prescriptions     Pending Prescriptions Disp Refills    zolpidem (AMBIEN) 10 MG tablet [Pharmacy Med Name: Zolpidem Tartrate 10 MG Oral Tablet] 30 tablet 0     Sig: TAKE 1 TABLET BY MOUTH AT NIGHT AS NEEDED FOR SLEEP      Last refill:  10/23/24 #30/0  Last office visit with prescribing clinician: 10/23/2024   Last telemedicine visit with prescribing clinician: Visit date not found   Next office visit with prescribing clinician: 2/5/2025                         Would you like a call back once the refill request has been completed: [] Yes [] No    If the office needs to give you a call back, can they leave a voicemail: [] Yes [] No    Lyubov Bearden RN  01/07/25, 13:23 EST

## 2025-01-08 DIAGNOSIS — N40.0 BENIGN PROSTATIC HYPERPLASIA WITHOUT LOWER URINARY TRACT SYMPTOMS: Primary | ICD-10-CM

## 2025-01-08 RX ORDER — DOXAZOSIN 2 MG/1
2 TABLET ORAL DAILY
Qty: 90 TABLET | Refills: 0 | Status: SHIPPED | OUTPATIENT
Start: 2025-01-08

## 2025-02-05 ENCOUNTER — OFFICE VISIT (OUTPATIENT)
Dept: INTERNAL MEDICINE | Facility: CLINIC | Age: 60
End: 2025-02-05
Payer: COMMERCIAL

## 2025-02-05 VITALS
SYSTOLIC BLOOD PRESSURE: 120 MMHG | HEART RATE: 76 BPM | TEMPERATURE: 97.8 F | DIASTOLIC BLOOD PRESSURE: 84 MMHG | BODY MASS INDEX: 33.65 KG/M2 | OXYGEN SATURATION: 97 % | HEIGHT: 71 IN | WEIGHT: 240.4 LBS

## 2025-02-05 DIAGNOSIS — Z00.00 WELL ADULT EXAM: Primary | ICD-10-CM

## 2025-02-05 DIAGNOSIS — F51.01 PRIMARY INSOMNIA: Chronic | ICD-10-CM

## 2025-02-05 DIAGNOSIS — E78.2 MIXED HYPERLIPIDEMIA: Chronic | ICD-10-CM

## 2025-02-05 DIAGNOSIS — E66.811 OBESITY, CLASS I, BMI 30-34.9: ICD-10-CM

## 2025-02-05 DIAGNOSIS — R73.03 PREDIABETES: Chronic | ICD-10-CM

## 2025-02-05 LAB
EXPIRATION DATE: ABNORMAL
HBA1C MFR BLD: 6.1 % (ref 4.5–5.7)
Lab: ABNORMAL

## 2025-02-05 PROCEDURE — 99214 OFFICE O/P EST MOD 30 MIN: CPT | Performed by: STUDENT IN AN ORGANIZED HEALTH CARE EDUCATION/TRAINING PROGRAM

## 2025-02-05 PROCEDURE — 99396 PREV VISIT EST AGE 40-64: CPT | Performed by: STUDENT IN AN ORGANIZED HEALTH CARE EDUCATION/TRAINING PROGRAM

## 2025-02-05 PROCEDURE — 83036 HEMOGLOBIN GLYCOSYLATED A1C: CPT | Performed by: STUDENT IN AN ORGANIZED HEALTH CARE EDUCATION/TRAINING PROGRAM

## 2025-02-05 NOTE — PROGRESS NOTES
Chief Complaint  Braydon Eugene is a 60 y.o. male presenting for Annual Exam and Hyperlipidemia.     From [place]. . He has 3 children from previous marriage, his wife has 2 children from previous marriage. They all live in [place]. 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     Patient has a past medical history of hyperlipidemia, prediabetes, seasonal allergies, BPH, lumbar DJD, insomnia, history of alcohol use (sober since 2003) and obesity.    History of Present Illness  Patient is here for annual physical and follow-up of his chronic health conditions.    He tells me his father passed in November 2023.  He was sick with low oxygen and was admitted to the hospital and discharged.  He was readmitted a couple of days after critically ill, and was found to have advanced cancer and passed at the time.  His mother passed more than 10 years ago from cancer.    About 2 weeks ago he was shoveling ice and clearing his driveway, and afterwards he noticed pain of his left hip area.  He still has his lower back pain at baseline.  This caused significant pain lingering, but over the last 2-3 days it has fairly much resolved.  For his lower back he has been rubbing CBD oil around the lower back and left gluteal area, about 3 times daily, and this seems to have helped him a lot.    He is struggling still with his sleep.  He does not nap during the daytime.  He often needs later in the evening, also sometimes gets up in the night and eats or drinks something sweet.  He often has a hard time falling asleep after.  Sometimes also does work on his phone later in the evening.  He has tried to cut back on Ambien and take half a dose, also taking a quarter of  CBD gummy to try to cut back.    He is very physically active in his work, but does not do other dedicated exercise.  He goes to the dentist once or twice yearly.    The following portions of the patient's history were reviewed and updated as appropriate:  "allergies, current medications, past family history, past medical history, past social history, past surgical history, and problem list.    Objective  /84 (BP Location: Right arm, Patient Position: Sitting, Cuff Size: Large Adult)   Pulse 76   Temp 97.8 °F (36.6 °C) (Infrared)   Ht 181.6 cm (71.5\")   Wt 109 kg (240 lb 6.4 oz)   SpO2 97%   BMI 33.07 kg/m²     Physical Exam  Vitals reviewed.   Constitutional:       Appearance: Normal appearance.   HENT:      Head: Normocephalic and atraumatic.      Right Ear: Tympanic membrane, ear canal and external ear normal. There is no impacted cerumen.      Left Ear: Tympanic membrane, ear canal and external ear normal. There is no impacted cerumen.      Nose: Nose normal. No congestion.      Mouth/Throat:      Mouth: Mucous membranes are moist.      Pharynx: Oropharynx is clear.   Eyes:      Extraocular Movements: Extraocular movements intact.      Conjunctiva/sclera: Conjunctivae normal.   Cardiovascular:      Rate and Rhythm: Normal rate and regular rhythm.      Heart sounds: Normal heart sounds. No murmur heard.  Pulmonary:      Effort: Pulmonary effort is normal. No respiratory distress.      Breath sounds: Normal breath sounds. No wheezing.   Abdominal:      General: There is no distension.      Palpations: Abdomen is soft. There is no mass.      Tenderness: There is no abdominal tenderness.   Musculoskeletal:      Cervical back: Neck supple. No tenderness.      Right lower leg: No edema.      Left lower leg: No edema.   Lymphadenopathy:      Cervical: No cervical adenopathy.   Skin:     General: Skin is warm and dry.   Neurological:      Mental Status: He is alert and oriented to person, place, and time. Mental status is at baseline.   Psychiatric:         Behavior: Behavior normal.         Thought Content: Thought content normal.         Assessment/Plan   1. Well adult exam  Counseled recommendations for annual flu shot and COVID booster.  Unfortunately we " are out.  For now he does not want the flu vaccine.  He is otherwise up-to-date on vaccines.  Counseled on recommendations for moderate intensity exercise 2.5 hours weekly.  Counseled on diet.  Counseled on recommendations for regular dental visits, at least annually, ideally twice yearly.    2. Primary insomnia  Chronic.  Does not nap during the daytime.  We had a long conversation regarding sleep hygiene.  Would also advise against eating or drinking anything except water within 2-3 hours before bedtime, and would avoid any eating in the night, which can activate the brain and contribute to his insomnia.  I support his decision to try to taper Ambien, which typically will not be helpful in the long run for chronic insomnia.  Also carries increased risk for falls later on in life.  He is motivated to slowly decrease and potentially taper off the medication.  There is always the option for for using it occasionally, ideally not more than once or twice a week.      3. Prediabetes  Hemoglobin A1C   Date Value Ref Range Status   02/05/2025 6.1 (A) 4.5 - 5.7 % Final   08/29/2024 5.90 (H) 4.80 - 5.60 % Final   08/03/2023 5.9 % Final   03/07/2023 6.00 (H) 4.80 - 5.60 % Final   08/18/2022 6.20 (H) 4.80 - 5.60 % Final   He did gain some weight since last year.  He typically gained some weight over the winter and loses some weight over the summer.  Encouraged healthy eating and exercise.    - POC Glycosylated Hemoglobin (Hb A1C)    4. Mixed hyperlipidemia  LDL at 73.  HDL at 25.  Continue on atorvastatin 20 mg.  Encouraged lifestyle activities and weight loss.    5. Obesity, Class I, BMI 30-34.9  BMI is >= 30 and <35. (Class 1 Obesity). The following options were offered after discussion;: exercise counseling/recommendations and nutrition counseling/recommendations    Return in about 6 months (around 8/5/2025) for Recheck.    Future Appointments         Provider Department Center    8/11/2025 9:00 AM Thomas Baltazar MD  [facility] INTERNAL MEDICINE ADRYAN Baltazar MD  Family Medicine  02/05/2025

## 2025-02-18 DIAGNOSIS — F51.01 PRIMARY INSOMNIA: Chronic | ICD-10-CM

## 2025-02-18 RX ORDER — ZOLPIDEM TARTRATE 10 MG/1
10 TABLET ORAL NIGHTLY PRN
Qty: 30 TABLET | Refills: 0 | Status: SHIPPED | OUTPATIENT
Start: 2025-02-18

## 2025-02-18 NOTE — TELEPHONE ENCOUNTER
Rx Refill Note  Requested Prescriptions     Pending Prescriptions Disp Refills    zolpidem (AMBIEN) 10 MG tablet [Pharmacy Med Name: Zolpidem Tartrate 10 MG Oral Tablet] 30 tablet 0     Sig: TAKE 1 TABLET BY MOUTH AT NIGHT AS NEEDED FOR SLEEP      Last refill:  1/7/25 #30/0  Last office visit with prescribing clinician: 2/5/2025   Last telemedicine visit with prescribing clinician: Visit date not found   Next office visit with prescribing clinician: 8/11/2025                         Would you like a call back once the refill request has been completed: [] Yes [] No    If the office needs to give you a call back, can they leave a voicemail: [] Yes [] No    Lyubov Bearden RN  02/18/25, 09:28 EST

## 2025-03-19 DIAGNOSIS — F51.01 PRIMARY INSOMNIA: Chronic | ICD-10-CM

## 2025-03-19 RX ORDER — ZOLPIDEM TARTRATE 10 MG/1
10 TABLET ORAL NIGHTLY PRN
Qty: 30 TABLET | Refills: 0 | Status: SHIPPED | OUTPATIENT
Start: 2025-03-19

## 2025-03-19 NOTE — TELEPHONE ENCOUNTER
Rx Refill Note  Requested Prescriptions     Pending Prescriptions Disp Refills    zolpidem (AMBIEN) 10 MG tablet [Pharmacy Med Name: Zolpidem Tartrate 10 MG Oral Tablet] 30 tablet 0     Sig: TAKE 1 TABLET BY MOUTH AT NIGHT AS NEEDED FOR SLEEP      Last office visit with prescribing clinician: 2/5/2025   Last telemedicine visit with prescribing clinician: Visit date not found   Next office visit with prescribing clinician: 8/11/2025                         Would you like a call back once the refill request has been completed: [] Yes [] No    If the office needs to give you a call back, can they leave a voicemail: [] Yes [] No    Mahnaz Arciniega MA  03/19/25, 12:22 EDT

## 2025-04-03 DIAGNOSIS — N40.0 BENIGN PROSTATIC HYPERPLASIA WITHOUT LOWER URINARY TRACT SYMPTOMS: ICD-10-CM

## 2025-04-03 RX ORDER — DOXAZOSIN 2 MG/1
2 TABLET ORAL DAILY
Qty: 90 TABLET | Refills: 0 | Status: SHIPPED | OUTPATIENT
Start: 2025-04-03

## 2025-04-03 NOTE — TELEPHONE ENCOUNTER
Rx Refill Note  Requested Prescriptions     Pending Prescriptions Disp Refills    doxazosin (CARDURA) 2 MG tablet [Pharmacy Med Name: Doxazosin Mesylate 2 MG Oral Tablet] 90 tablet 0     Sig: Take 1 tablet by mouth once daily      Last office visit with prescribing clinician: 2/5/2025   Last telemedicine visit with prescribing clinician: Visit date not found   Next office visit with prescribing clinician: 8/11/2025                         Would you like a call back once the refill request has been completed: [] Yes [] No    If the office needs to give you a call back, can they leave a voicemail: [] Yes [] No    Carlyn Reveles MA  04/03/25, 08:54 EDT

## 2025-04-19 DIAGNOSIS — F51.01 PRIMARY INSOMNIA: Chronic | ICD-10-CM

## 2025-04-21 RX ORDER — ZOLPIDEM TARTRATE 10 MG/1
10 TABLET ORAL NIGHTLY PRN
Qty: 30 TABLET | Refills: 0 | Status: SHIPPED | OUTPATIENT
Start: 2025-04-21

## 2025-04-21 NOTE — TELEPHONE ENCOUNTER
Rx Refill Note  Requested Prescriptions     Pending Prescriptions Disp Refills    zolpidem (AMBIEN) 10 MG tablet [Pharmacy Med Name: Zolpidem Tartrate 10 MG Oral Tablet] 30 tablet 0     Sig: TAKE 1 TABLET BY MOUTH AT NIGHT AS NEEDED FOR SLEEP      Last office visit with prescribing clinician: 2/5/2025   Last telemedicine visit with prescribing clinician: Visit date not found   Next office visit with prescribing clinician: 8/11/2025                         Would you like a call back once the refill request has been completed: [] Yes [] No    If the office needs to give you a call back, can they leave a voicemail: [] Yes [] No    Sonam Bearden MA  04/21/25, 08:44 EDT

## 2025-05-20 DIAGNOSIS — F51.01 PRIMARY INSOMNIA: Chronic | ICD-10-CM

## 2025-05-20 RX ORDER — ZOLPIDEM TARTRATE 10 MG/1
10 TABLET ORAL NIGHTLY PRN
Qty: 30 TABLET | Refills: 0 | Status: SHIPPED | OUTPATIENT
Start: 2025-05-20

## 2025-05-20 NOTE — TELEPHONE ENCOUNTER
Rx Refill Note  Requested Prescriptions     Pending Prescriptions Disp Refills    zolpidem (AMBIEN) 10 MG tablet [Pharmacy Med Name: Zolpidem Tartrate 10 MG Oral Tablet] 30 tablet 0     Sig: TAKE 1 TABLET BY MOUTH AT NIGHT AS NEEDED FOR SLEEP      Last office visit with prescribing clinician: 2/5/2025   Next office visit with prescribing clinician: 8/11/2025     LA: 04/21/25 #30 0R                          Would you like a call back once the refill request has been completed: [] Yes [] No    If the office needs to give you a call back, can they leave a voicemail: [] Yes [] No    Meghan Urena LPN  05/20/25, 13:12 EDT

## 2025-06-09 RX ORDER — ATORVASTATIN CALCIUM 20 MG/1
20 TABLET, FILM COATED ORAL DAILY
Qty: 30 TABLET | Refills: 0 | Status: SHIPPED | OUTPATIENT
Start: 2025-06-09

## 2025-06-23 DIAGNOSIS — F51.01 PRIMARY INSOMNIA: Chronic | ICD-10-CM

## 2025-06-23 RX ORDER — ZOLPIDEM TARTRATE 10 MG/1
10 TABLET ORAL NIGHTLY PRN
Qty: 30 TABLET | Refills: 0 | Status: SHIPPED | OUTPATIENT
Start: 2025-06-23

## 2025-06-23 NOTE — TELEPHONE ENCOUNTER
Rx Refill Note  Requested Prescriptions     Pending Prescriptions Disp Refills    zolpidem (AMBIEN) 10 MG tablet [Pharmacy Med Name: Zolpidem Tartrate 10 MG Oral Tablet] 30 tablet 0     Sig: TAKE 1 TABLET BY MOUTH AT NIGHT AS NEEDED FOR SLEEP      Last refill:  5/20/25 #30/0  Last office visit with prescribing clinician: 2/5/2025   Last telemedicine visit with prescribing clinician: Visit date not found   Next office visit with prescribing clinician: 8/11/2025                         Would you like a call back once the refill request has been completed: [] Yes [] No    If the office needs to give you a call back, can they leave a voicemail: [] Yes [] No    Lyubov Bearden RN  06/23/25, 13:08 EDT

## 2025-06-28 DIAGNOSIS — N40.0 BENIGN PROSTATIC HYPERPLASIA WITHOUT LOWER URINARY TRACT SYMPTOMS: ICD-10-CM

## 2025-06-30 RX ORDER — DOXAZOSIN 2 MG/1
2 TABLET ORAL DAILY
Qty: 90 TABLET | Refills: 0 | Status: SHIPPED | OUTPATIENT
Start: 2025-06-30

## 2025-07-08 RX ORDER — ATORVASTATIN CALCIUM 20 MG/1
20 TABLET, FILM COATED ORAL DAILY
Qty: 30 TABLET | Refills: 0 | Status: SHIPPED | OUTPATIENT
Start: 2025-07-08

## 2025-07-08 NOTE — TELEPHONE ENCOUNTER
Rx Refill Note  Requested Prescriptions     Pending Prescriptions Disp Refills    atorvastatin (LIPITOR) 20 MG tablet [Pharmacy Med Name: Atorvastatin Calcium 20 MG Oral Tablet] 30 tablet 0     Sig: Take 1 tablet by mouth once daily      Last office visit with prescribing clinician: 2/5/2025   Last telemedicine visit with prescribing clinician: Visit date not found   Next office visit with prescribing clinician: 8/11/2025                         Would you like a call back once the refill request has been completed: [] Yes [] No    If the office needs to give you a call back, can they leave a voicemail: [] Yes [] No    Carlyn Reveles MA  07/08/25, 08:31 EDT

## 2025-07-24 DIAGNOSIS — F51.01 PRIMARY INSOMNIA: Chronic | ICD-10-CM

## 2025-07-24 RX ORDER — ZOLPIDEM TARTRATE 10 MG/1
10 TABLET ORAL NIGHTLY PRN
Qty: 30 TABLET | Refills: 0 | Status: SHIPPED | OUTPATIENT
Start: 2025-07-24

## 2025-07-24 NOTE — TELEPHONE ENCOUNTER
Rx Refill Note  Requested Prescriptions     Pending Prescriptions Disp Refills    zolpidem (AMBIEN) 10 MG tablet [Pharmacy Med Name: Zolpidem Tartrate 10 MG Oral Tablet] 30 tablet 0     Sig: TAKE 1 TABLET BY MOUTH AT NIGHT AS NEEDED FOR SLEEP      Last office visit with prescribing clinician: 2/5/2025   Last telemedicine visit with prescribing clinician: Visit date not found   Next office visit with prescribing clinician: 8/11/2025                         Would you like a call back once the refill request has been completed: [] Yes [] No    If the office needs to give you a call back, can they leave a voicemail: [] Yes [] No    Carlyn Reveles MA  07/24/25, 09:15 EDT

## 2025-08-11 ENCOUNTER — HOSPITAL ENCOUNTER (OUTPATIENT)
Dept: GENERAL RADIOLOGY | Facility: HOSPITAL | Age: 60
Discharge: HOME | End: 2025-08-11
Admitting: STUDENT IN AN ORGANIZED HEALTH CARE EDUCATION/TRAINING PROGRAM
Payer: COMMERCIAL

## 2025-08-11 ENCOUNTER — OFFICE VISIT (OUTPATIENT)
Dept: INTERNAL MEDICINE | Facility: CLINIC | Age: 60
End: 2025-08-11
Payer: COMMERCIAL

## 2025-08-11 VITALS
DIASTOLIC BLOOD PRESSURE: 78 MMHG | TEMPERATURE: 97.5 F | HEIGHT: 71 IN | SYSTOLIC BLOOD PRESSURE: 130 MMHG | BODY MASS INDEX: 32.9 KG/M2 | HEART RATE: 60 BPM | WEIGHT: 235 LBS

## 2025-08-11 DIAGNOSIS — M79.645 PAIN OF LEFT THUMB: Primary | ICD-10-CM

## 2025-08-11 DIAGNOSIS — M47.816 SPONDYLOSIS OF LUMBAR REGION WITHOUT MYELOPATHY OR RADICULOPATHY: Chronic | ICD-10-CM

## 2025-08-11 DIAGNOSIS — F51.01 PRIMARY INSOMNIA: Chronic | ICD-10-CM

## 2025-08-11 DIAGNOSIS — Z13.21 ENCOUNTER FOR VITAMIN DEFICIENCY SCREENING: ICD-10-CM

## 2025-08-11 DIAGNOSIS — M79.645 PAIN OF LEFT THUMB: ICD-10-CM

## 2025-08-11 DIAGNOSIS — Z12.11 SCREEN FOR COLON CANCER: ICD-10-CM

## 2025-08-11 DIAGNOSIS — E78.2 MIXED HYPERLIPIDEMIA: Chronic | ICD-10-CM

## 2025-08-11 DIAGNOSIS — R73.03 PREDIABETES: Chronic | ICD-10-CM

## 2025-08-11 DIAGNOSIS — Z12.5 SCREENING PSA (PROSTATE SPECIFIC ANTIGEN): ICD-10-CM

## 2025-08-11 LAB
EXPIRATION DATE: ABNORMAL
HBA1C MFR BLD: 5.9 % (ref 4.5–5.7)
Lab: ABNORMAL

## 2025-08-11 PROCEDURE — 99214 OFFICE O/P EST MOD 30 MIN: CPT | Performed by: STUDENT IN AN ORGANIZED HEALTH CARE EDUCATION/TRAINING PROGRAM

## 2025-08-11 PROCEDURE — 73130 X-RAY EXAM OF HAND: CPT

## 2025-08-11 PROCEDURE — 83036 HEMOGLOBIN GLYCOSYLATED A1C: CPT | Performed by: STUDENT IN AN ORGANIZED HEALTH CARE EDUCATION/TRAINING PROGRAM

## 2025-08-11 RX ORDER — ATORVASTATIN CALCIUM 20 MG/1
20 TABLET, FILM COATED ORAL DAILY
Qty: 30 TABLET | Refills: 0 | Status: SHIPPED | OUTPATIENT
Start: 2025-08-11

## 2025-08-25 ENCOUNTER — OFFICE VISIT (OUTPATIENT)
Dept: ORTHOPEDIC SURGERY | Facility: CLINIC | Age: 60
End: 2025-08-25
Payer: COMMERCIAL

## 2025-08-25 VITALS
SYSTOLIC BLOOD PRESSURE: 128 MMHG | WEIGHT: 235 LBS | BODY MASS INDEX: 31.83 KG/M2 | HEIGHT: 72 IN | DIASTOLIC BLOOD PRESSURE: 70 MMHG

## 2025-08-25 DIAGNOSIS — M18.12 ARTHRITIS OF CARPOMETACARPAL (CMC) JOINT OF LEFT THUMB: Primary | ICD-10-CM

## 2025-08-25 DIAGNOSIS — F51.01 PRIMARY INSOMNIA: Chronic | ICD-10-CM

## 2025-08-25 PROCEDURE — 20600 DRAIN/INJ JOINT/BURSA W/O US: CPT | Performed by: STUDENT IN AN ORGANIZED HEALTH CARE EDUCATION/TRAINING PROGRAM

## 2025-08-25 PROCEDURE — 99204 OFFICE O/P NEW MOD 45 MIN: CPT | Performed by: STUDENT IN AN ORGANIZED HEALTH CARE EDUCATION/TRAINING PROGRAM

## 2025-08-25 RX ORDER — DEXAMETHASONE SODIUM PHOSPHATE 4 MG/ML
2 INJECTION, SOLUTION INTRA-ARTICULAR; INTRALESIONAL; INTRAMUSCULAR; INTRAVENOUS; SOFT TISSUE
Status: COMPLETED | OUTPATIENT
Start: 2025-08-25 | End: 2025-08-25

## 2025-08-25 RX ORDER — ZOLPIDEM TARTRATE 10 MG/1
10 TABLET ORAL NIGHTLY PRN
Qty: 30 TABLET | Refills: 0 | Status: SHIPPED | OUTPATIENT
Start: 2025-08-25

## 2025-08-25 RX ORDER — LIDOCAINE HYDROCHLORIDE 10 MG/ML
0.5 INJECTION, SOLUTION EPIDURAL; INFILTRATION; INTRACAUDAL; PERINEURAL
Status: COMPLETED | OUTPATIENT
Start: 2025-08-25 | End: 2025-08-25

## 2025-08-25 RX ADMIN — LIDOCAINE HYDROCHLORIDE 0.5 ML: 10 INJECTION, SOLUTION EPIDURAL; INFILTRATION; INTRACAUDAL; PERINEURAL at 08:11

## 2025-08-25 RX ADMIN — DEXAMETHASONE SODIUM PHOSPHATE 2 MG: 4 INJECTION, SOLUTION INTRA-ARTICULAR; INTRALESIONAL; INTRAMUSCULAR; INTRAVENOUS; SOFT TISSUE at 08:11
